# Patient Record
Sex: FEMALE | Race: WHITE
[De-identification: names, ages, dates, MRNs, and addresses within clinical notes are randomized per-mention and may not be internally consistent; named-entity substitution may affect disease eponyms.]

---

## 2017-02-13 ENCOUNTER — HOSPITAL ENCOUNTER (OUTPATIENT)
Dept: HOSPITAL 62 - RAD | Age: 42
End: 2017-02-13
Attending: PHYSICIAN ASSISTANT
Payer: MEDICAID

## 2017-02-13 DIAGNOSIS — M79.672: Primary | ICD-10-CM

## 2017-02-13 PROCEDURE — A9503 TC99M MEDRONATE: HCPCS

## 2017-02-13 PROCEDURE — 78315 BONE IMAGING 3 PHASE: CPT

## 2017-03-20 ENCOUNTER — HOSPITAL ENCOUNTER (EMERGENCY)
Dept: HOSPITAL 62 - ER | Age: 42
Discharge: LEFT BEFORE DISCHARGE | End: 2017-03-20
Payer: MEDICAID

## 2017-03-20 DIAGNOSIS — Z53.9: Primary | ICD-10-CM

## 2017-03-20 DIAGNOSIS — S69.90XA: ICD-10-CM

## 2018-02-12 ENCOUNTER — HOSPITAL ENCOUNTER (OUTPATIENT)
Dept: HOSPITAL 62 - RAD | Age: 43
End: 2018-02-12
Attending: PHYSICIAN ASSISTANT
Payer: MEDICAID

## 2018-02-12 DIAGNOSIS — R59.1: Primary | ICD-10-CM

## 2018-02-12 PROCEDURE — 76536 US EXAM OF HEAD AND NECK: CPT

## 2018-02-12 NOTE — RADIOLOGY REPORT (SQ)
EXAM DESCRIPTION:  U/S THYROID/SFT TISS HD   NECK



COMPLETED DATE/TIME:  2/12/2018 4:56 pm



REASON FOR STUDY:  GENERALIZED ENLARGED LYMPH NODES R59.1  GENERALIZED ENLARGED LYMPH NODES



COMPARISON:  None.



TECHNIQUE:  Dynamic and static gray-scale images acquired of the thyroid gland. Selected additional c
olor/power Doppler images recorded.  All images stored to PACS.



LIMITATIONS:  None.



FINDINGS:  RIGHT LOBE: Normal size.  Homogeneous echotexture.  4 mm hypoechoic nodule.

LEFT LOBE: Normal size.  Homogeneous echotexture.  No cystic or solid masses.

ISTHMUS: Normal size.  Homogeneous echotexture.  No cystic or solid masses.

OTHER: No other significant finding.



IMPRESSION:  4 mm nodule right lobe.



TECHNICAL DOCUMENTATION:  JOB ID:  [id number]

 2011 [employer]- All Rights Reserved

## 2018-07-19 ENCOUNTER — HOSPITAL ENCOUNTER (OUTPATIENT)
Dept: HOSPITAL 62 - OD | Age: 43
End: 2018-07-19
Attending: INTERNAL MEDICINE
Payer: MEDICAID

## 2018-07-19 DIAGNOSIS — D50.9: Primary | ICD-10-CM

## 2018-07-19 LAB
ADD MANUAL DIFF: (no result)
ANISOCYTOSIS BLD QL SMEAR: (no result)
BASOPHILS # BLD AUTO: 0.1 10^3/UL (ref 0–0.2)
BASOPHILS NFR BLD AUTO: 0.6 % (ref 0–2)
EOSINOPHIL # BLD AUTO: 0.4 10^3/UL (ref 0–0.6)
EOSINOPHIL NFR BLD AUTO: 3.8 % (ref 0–6)
ERYTHROCYTE [DISTWIDTH] IN BLOOD BY AUTOMATED COUNT: 18.3 % (ref 11.5–14)
HCT VFR BLD CALC: 26.4 % (ref 36–47)
HGB BLD-MCNC: 8.2 G/DL (ref 12–15.5)
IRON(TIBC): < 10.1 UG/DL (ref 37–170)
LYMPHOCYTES # BLD AUTO: 2.2 10^3/UL (ref 0.5–4.7)
LYMPHOCYTES NFR BLD AUTO: 23.8 % (ref 13–45)
MCH RBC QN AUTO: 19.1 PG (ref 27–33.4)
MCHC RBC AUTO-ENTMCNC: 30.9 G/DL (ref 32–36)
MCV RBC AUTO: 62 FL (ref 80–97)
MONOCYTES # BLD AUTO: 0.5 10^3/UL (ref 0.1–1.4)
MONOCYTES NFR BLD AUTO: 5.2 % (ref 3–13)
NEUTROPHILS # BLD AUTO: 6.2 10^3/UL (ref 1.7–8.2)
NEUTS SEG NFR BLD AUTO: 66.6 % (ref 42–78)
OVALOCYTES BLD QL SMEAR: (no result)
PLATELET # BLD: 537 10^3/UL (ref 150–450)
PLATELET COMMENT: (no result)
POIKILOCYTOSIS BLD QL SMEAR: (no result)
POLYCHROMASIA BLD QL SMEAR: SLIGHT
RBC # BLD AUTO: 4.26 10^6/UL (ref 3.72–5.28)
TARGETS BLD QL SMEAR: SLIGHT
TOTAL CELLS COUNTED % (AUTO): 100 %
WBC # BLD AUTO: 9.4 10^3/UL (ref 4–10.5)

## 2018-07-19 PROCEDURE — 36415 COLL VENOUS BLD VENIPUNCTURE: CPT

## 2018-07-19 PROCEDURE — 83540 ASSAY OF IRON: CPT

## 2018-07-19 PROCEDURE — 83550 IRON BINDING TEST: CPT

## 2018-07-19 PROCEDURE — 82306 VITAMIN D 25 HYDROXY: CPT

## 2018-07-19 PROCEDURE — 82728 ASSAY OF FERRITIN: CPT

## 2018-07-19 PROCEDURE — 85025 COMPLETE CBC W/AUTO DIFF WBC: CPT

## 2018-07-20 LAB — PATH REV BLD -IMP: (no result)

## 2018-07-27 ENCOUNTER — HOSPITAL ENCOUNTER (OUTPATIENT)
Dept: HOSPITAL 62 - WI | Age: 43
End: 2018-07-27
Attending: INTERNAL MEDICINE
Payer: MEDICAID

## 2018-07-27 DIAGNOSIS — Z12.31: Primary | ICD-10-CM

## 2018-07-27 PROCEDURE — 77067 SCR MAMMO BI INCL CAD: CPT

## 2018-07-27 PROCEDURE — 77063 BREAST TOMOSYNTHESIS BI: CPT

## 2018-07-27 NOTE — WOMENS IMAGING REPORT
EXAM DESCRIPTION:  3D SCREENING MAMMO BILAT



COMPLETED DATE/TIME:  7/27/2018 2:00 pm



REASON FOR STUDY:  SCREENING MAMMO Z12.31  ENCNTR SCREEN MAMMOGRAM FOR MALIGNANT NEOPLASM OF KVNG



COMPARISON:  None.



TECHNIQUE:  Standard craniocaudal and mediolateral oblique views of each breast recorded using digita
l acquisition and breast tomosynthesis.



LIMITATIONS:  None.



FINDINGS:  No masses, calcifications or architectural distortion. No areas of suspicion.

Read with the assistance of CAD.

.[facility] - R2 Cenova Version 1.3

.[facility] - R2 Cenova Version 1.3

.[facility] - R2 Cenova Version 2.4

.[facility] - R2 Cenova Version 2.4

.[facility] - R2  Version 9.2



IMPRESSION:  NORMAL MAMMOGRAM.  BIRADS 1.



BREAST DENSITY:  b. There are scattered areas of fibroglandular density.



BIRAD:  1 NEGATIVE



RECOMMENDATION:  ROUTINE SCREENING



COMMENT:  The patient has been notified of the results by letter per SA requirements. Additional no
tification policies are in place for contacting patient with suspicious or incomplete findings.

Quality ID #225: The American College of Radiology recommends an annual screening mammogram for women
 aged 40 years or over. This facility utilizes a reminder system to ensure that all patients receive 
reminder letters, and/or direct phone calls for appointments. This includes reminders for routine scr
eening mammograms, diagnostic mammograms, or other Breast Imaging Interventions when appropriate.  Th
is patient will be placed in the appropriate reminder system.

The American College of Radiology (ACR) has developed recommendations for screening MRI of the breast
s in certain patient populations, to be used in conjunction with mammography.  Breast MRI surveillanc
e may be appropriate for women with more than 20% lifetime risk of developing breast cancer  as deter
mined by genetic testing, significant family history of the disease, or history of mantle radiation f
or Hodgkins Disease.  ACR Practice Guidelines 2008.

[employer]



PQRS 6045F: Fluoroscopic imaging is not utilized for breast tomosynthesis.



TECHNICAL DOCUMENTATION:  FINDING NUMBER: (1)

ASSESSMENT:  (1)

JOB ID:  [id number]

 2011 [employer]- All Rights Reserved



Reading location - IP/workstation name: [facility]

## 2019-02-09 ENCOUNTER — HOSPITAL ENCOUNTER (EMERGENCY)
Dept: HOSPITAL 62 - ER | Age: 44
Discharge: HOME | End: 2019-02-09
Payer: MEDICAID

## 2019-02-09 VITALS — SYSTOLIC BLOOD PRESSURE: 107 MMHG | DIASTOLIC BLOOD PRESSURE: 69 MMHG

## 2019-02-09 DIAGNOSIS — R06.02: ICD-10-CM

## 2019-02-09 DIAGNOSIS — R07.9: Primary | ICD-10-CM

## 2019-02-09 DIAGNOSIS — R20.0: ICD-10-CM

## 2019-02-09 LAB
ADD MANUAL DIFF: NO
ALBUMIN SERPL-MCNC: 4.8 G/DL (ref 3.5–5)
ALP SERPL-CCNC: 98 U/L (ref 38–126)
ALT SERPL-CCNC: < 6 U/L (ref 9–52)
ANION GAP SERPL CALC-SCNC: 12 MMOL/L (ref 5–19)
AST SERPL-CCNC: 18 U/L (ref 14–36)
BASOPHILS # BLD AUTO: 0.1 10^3/UL (ref 0–0.2)
BASOPHILS NFR BLD AUTO: 1.1 % (ref 0–2)
BILIRUB DIRECT SERPL-MCNC: 0.3 MG/DL (ref 0–0.4)
BILIRUB SERPL-MCNC: 0.4 MG/DL (ref 0.2–1.3)
BUN SERPL-MCNC: 8 MG/DL (ref 7–20)
CALCIUM: 9.6 MG/DL (ref 8.4–10.2)
CHLORIDE SERPL-SCNC: 103 MMOL/L (ref 98–107)
CK MB SERPL-MCNC: 0.48 NG/ML (ref ?–4.55)
CK SERPL-CCNC: 55 U/L (ref 30–135)
CO2 SERPL-SCNC: 27 MMOL/L (ref 22–30)
EOSINOPHIL # BLD AUTO: 0.4 10^3/UL (ref 0–0.6)
EOSINOPHIL NFR BLD AUTO: 3.8 % (ref 0–6)
ERYTHROCYTE [DISTWIDTH] IN BLOOD BY AUTOMATED COUNT: 18.2 % (ref 11.5–14)
GLUCOSE SERPL-MCNC: 117 MG/DL (ref 75–110)
HCT VFR BLD CALC: 32.5 % (ref 36–47)
HGB BLD-MCNC: 10.2 G/DL (ref 12–15.5)
LYMPHOCYTES # BLD AUTO: 2.4 10^3/UL (ref 0.5–4.7)
LYMPHOCYTES NFR BLD AUTO: 23.4 % (ref 13–45)
MCH RBC QN AUTO: 21.5 PG (ref 27–33.4)
MCHC RBC AUTO-ENTMCNC: 31.4 G/DL (ref 32–36)
MCV RBC AUTO: 68 FL (ref 80–97)
MONOCYTES # BLD AUTO: 0.4 10^3/UL (ref 0.1–1.4)
MONOCYTES NFR BLD AUTO: 4.4 % (ref 3–13)
NEUTROPHILS # BLD AUTO: 6.8 10^3/UL (ref 1.7–8.2)
NEUTS SEG NFR BLD AUTO: 67.3 % (ref 42–78)
PLATELET # BLD: 551 10^3/UL (ref 150–450)
POTASSIUM SERPL-SCNC: 4.1 MMOL/L (ref 3.6–5)
PROT SERPL-MCNC: 8.3 G/DL (ref 6.3–8.2)
RBC # BLD AUTO: 4.76 10^6/UL (ref 3.72–5.28)
SODIUM SERPL-SCNC: 141.8 MMOL/L (ref 137–145)
TOTAL CELLS COUNTED % (AUTO): 100 %
TROPONIN I SERPL-MCNC: < 0.012 NG/ML
WBC # BLD AUTO: 10.1 10^3/UL (ref 4–10.5)

## 2019-02-09 PROCEDURE — 93005 ELECTROCARDIOGRAM TRACING: CPT

## 2019-02-09 PROCEDURE — 80053 COMPREHEN METABOLIC PANEL: CPT

## 2019-02-09 PROCEDURE — 82550 ASSAY OF CK (CPK): CPT

## 2019-02-09 PROCEDURE — 85025 COMPLETE CBC W/AUTO DIFF WBC: CPT

## 2019-02-09 PROCEDURE — 82553 CREATINE MB FRACTION: CPT

## 2019-02-09 PROCEDURE — 84484 ASSAY OF TROPONIN QUANT: CPT

## 2019-02-09 PROCEDURE — 71045 X-RAY EXAM CHEST 1 VIEW: CPT

## 2019-02-09 PROCEDURE — 36415 COLL VENOUS BLD VENIPUNCTURE: CPT

## 2019-02-09 PROCEDURE — 93010 ELECTROCARDIOGRAM REPORT: CPT

## 2019-02-09 PROCEDURE — 99285 EMERGENCY DEPT VISIT HI MDM: CPT

## 2019-02-09 NOTE — RADIOLOGY REPORT (SQ)
EXAM DESCRIPTION:  CHEST SINGLE VIEW



COMPLETED DATE/TIME:  2/9/2019 4:11 pm



REASON FOR STUDY:  cp



COMPARISON:  11/5/2014



TECHNIQUE:  Single frontal radiographic view of the chest acquired.



NUMBER OF VIEWS:  One view.



LIMITATIONS:  None.



FINDINGS:  LUNGS AND PLEURA: No pneumothorax. No consolidation or pleural effusion.

MEDIASTINUM AND HILAR STRUCTURES: Stable.

HEART AND VASCULAR STRUCTURES: Stable.

BONES: No acute findings.

HARDWARE: None in the chest.

OTHER: No other significant finding.



IMPRESSION:  NO ACUTE FINDINGS.



TECHNICAL DOCUMENTATION:  JOB ID:  [id number]

[id number]

 2011 [employer]- All Rights Reserved



Reading location - IP/workstation name: [employer]

## 2019-02-09 NOTE — EKG REPORT
SEVERITY:- BORDERLINE ECG -

SINUS RHYTHM

BORDERLINE T ABNORMALITIES, DIFFUSE LEADS

:

Confirmed by: Tony Sherwood 09-Feb-2019 20:11:32

## 2019-02-09 NOTE — ER DOCUMENT REPORT
ED General





- General


Chief Complaint: Chest Pain


Stated Complaint: SHORT OF BREATH/CHEST DISCOMFORT


Time Seen by Provider: 19 15:43


Primary Care Provider: 


LETHA CALLAWAY MD [Primary Care Provider] - Follow up as needed


TRAVEL OUTSIDE OF THE [place] IN LAST 30 DAYS: No





- HPI


Notes: 





Patient presents emergency department for evaluation of chest pain.  She has had

numbness and tingling in bilateral arms as well.  The numbness and tingling is 

been present for 3 days.  Chest pain started yesterday afternoon.  It has been 

constant since onset.  It is diffuse.  She describes it as a heaviness.  It does

not radiate.  She denies any aggravating or alleviating factors.  She states 

exertion does not seem to change in any way.  She also describes a separate pain

in her lateral chest bilaterally.  She describes it as achy and states it is 

worsened with deep breaths.  She denies any associated shortness of breath, 

nausea, diaphoresis, near syncope.





- Related Data


Allergies/Adverse Reactions: 


                                        





doxycycline [Doxycycline] Allergy (Severe, Verified 19 14:49)


   Difficulty breathing


metronidazole [From Flagyl] Allergy (Severe, Verified 19 14:49)


   Difficulty breathing


Metronidazole HCl [From Flagyl] Allergy (Severe, Verified 19 14:49)


   Difficulty breathing


Penicillins Allergy (Unknown, Verified 19 14:49)


   Unknown reaction


Pickles Allergy (Uncoded 19 14:49)


   











Past Medical History





- General


Information source: Patient





- Social History


Smoking Status: Never Smoker


Frequency of alcohol use: None


Drug Abuse: None


Family History: Arthritis, CAD, CVA, DM, Hyperlipidemia, Hypertension, 

Malignancy, Thyroid Disfunction


Patient has suicidal ideation: No


Patient has homicidal ideation: No





- Past Medical History


Cardiac Medical History: 


   Denies: Hx Hypertension - after pregnancy; no longer


Pulmonary Medical History: Reports: Hx Bronchitis


Endocrine Medical History: Denies: Hx Diabetes Mellitus Type 1, Hx Diabetes 

Mellitus Type 2


Renal/ Medical History: Reports: Hx Ovarian Cysts.  Denies: Hx Peritoneal 

Dialysis


Musculoskeletal Medical History: Reports Hx Musculoskeletal Trauma - left foot 

fracture


Traumatic Medical History: Reports: Hx Fractures


Past Surgical History: Reports: Hx  Section - x3, Hx Kidney (Renal 

Surgery) - at age 14 months





- Immunizations


Hx Diphtheria, Pertussis, Tetanus Vaccination: Yes





Review of Systems





- Review of Systems


Constitutional: No symptoms reported


EENT: No symptoms reported


Cardiovascular: Chest pain


Respiratory: No symptoms reported


Gastrointestinal: No symptoms reported


Genitourinary: No symptoms reported


Skin: No symptoms reported


Hematologic/Lymphatic: No symptoms reported


Neurological/Psychological: No symptoms reported





Physical Exam





- Vital signs


Vitals: 





                                        











Temp Pulse Resp BP Pulse Ox


 


 98.8 F   81   16   150/78 H  98 


 


 19 15:07  19 15:07  19 15:07  19 15:07  19 15:07














- General


General appearance: Appears well


In distress: None





- HEENT


Head: Normocephalic


Pupils: PERRL





- Respiratory


Respiratory status: No respiratory distress


Chest status: Tender





- Cardiovascular


Rhythm: Regular


Heart sounds: Normal auscultation





- Abdominal


Inspection: Normal


Distension: No distension


Bowel sounds: Normal


Tenderness: Nontender





- Extremities


General upper extremity: Normal inspection


General lower extremity: Normal inspection.  No: Tender





- Neurological


Cognition: Normal


Orientation: AAOx4





- Skin


Skin Temperature: Warm


Skin Moisture: Dry





Course





- Re-evaluation


Re-evalutation: 





19 18:19


Patient presents to the emergency department for evaluation of chest pain.  She 

has multiple pains in her chest, they have been present for over 24 hours.  She 

does have mild risk factors with this pain does not seem cardiac in nature.  We 

discussed this at length.  I cleaned her the no test I performed here could 

totally rule out coronary artery disease.  I strongly encouraged her to follow-

up with her family doctor and address all modifiable risk factors.  She voiced 

understanding to this.  She states she does not "think it is my heart."  I 

explained to her that she would need further testing to further evaluate for 

this.  She understands this.  She will follow-up with Dr. AVILA.  She is feeling 

improved here and will be discharged.





- Vital Signs


Vital signs: 





                                        











Temp Pulse Resp BP Pulse Ox


 


 98.8 F   81   20   95/65 L  99 


 


 19 15:07  19 15:07  19 18:01  19 18:01  19 18:01














- Laboratory


Result Diagrams: 


                                 19 15:47





                                 19 15:47


Laboratory results interpreted by me: 





                                        











  02/09/19 02/09/19





  15:47 15:47


 


Hgb  10.2 L 


 


Hct  32.5 L 


 


MCV  68 L 


 


MCH  21.5 L 


 


MCHC  31.4 L 


 


RDW  18.2 H 


 


Plt Count  551 H 


 


Glucose   117 H


 


ALT   < 6 L


 


Total Protein   8.3 H














- Diagnostic Test


Radiology reviewed: Image reviewed, Reports reviewed





- EKG Interpretation by Me


EKG shows normal: Sinus rhythm


Rate: Normal


Rhythm: NSR


Additional EKG results interpreted by me: 





19 18:18


Normal sinus rhythm rate of 79 bpm.  Normal axis, intervals, no acute ST-T wave 

changes concerning for ischemia or infarction.





Discharge





- Discharge


Clinical Impression: 


 Chest pain





Disposition: HOME, SELF-CARE


Instructions:  Chest Pain of Unclear Cause ([facility])


Additional Instructions: 


No clear etiology was found for your chest pain today.  Follow-up with your 

primary care physician this week.  If your pain worsens, you develop new or 

concerning symptoms of any sort, return immediately to the emergency department 

for evaluation.


Referrals: 


LETHA CALLAWAY MD [Primary Care Provider] - Follow up as needed

## 2019-02-26 ENCOUNTER — HOSPITAL ENCOUNTER (EMERGENCY)
Dept: HOSPITAL 62 - ER | Age: 44
Discharge: HOME | End: 2019-02-26
Payer: MEDICAID

## 2019-02-26 VITALS — SYSTOLIC BLOOD PRESSURE: 116 MMHG | DIASTOLIC BLOOD PRESSURE: 87 MMHG

## 2019-02-26 DIAGNOSIS — M25.572: Primary | ICD-10-CM

## 2019-02-26 PROCEDURE — 73610 X-RAY EXAM OF ANKLE: CPT

## 2019-02-26 PROCEDURE — L1902 AFO ANKLE GAUNTLET PRE OTS: HCPCS

## 2019-02-26 PROCEDURE — 99283 EMERGENCY DEPT VISIT LOW MDM: CPT

## 2019-02-26 NOTE — RADIOLOGY REPORT (SQ)
EXAM DESCRIPTION:  ANKLE LEFT COMPLETE



COMPLETED DATE/TIME:  2/26/2019 1:56 pm



REASON FOR STUDY:  pain s/p injury, rolled her ankle



COMPARISON:  None.



NUMBER OF VIEWS:  Three views.



TECHNIQUE:  AP, lateral, and oblique radiographic images acquired of the left ankle.



LIMITATIONS:  None.



FINDINGS:  MINERALIZATION: Normal.

BONES: No acute fracture or dislocation.  No worrisome bone lesions.

JOINTS: No effusions.

SOFT TISSUES: No soft tissue swelling. No foreign body.

OTHER: No other significant finding.



IMPRESSION:  NEGATIVE STUDY OF THE LEFT ANKLE. NO RADIOGRAPHIC EVIDENCE OF ACUTE INJURY.



TECHNICAL DOCUMENTATION:  JOB ID:  [id number]

 2011 [employer]- All Rights Reserved



Reading location - IP/workstation name: [email or web address]

## 2019-02-26 NOTE — ER DOCUMENT REPORT
HPI





- HPI


Time Seen by Provider: 19 13:30


Pain Level: 5


Notes: 





Patient is a 43-year-old female with no significant past medical history who 

presents to the emergency department complaining of left lateral ankle pain 

status post injury prior to arrival.  Patient states that she stepped into a 

hole and her ankle inverted causing pain.  Patient states that she heard a 

"pop."  Pain does not radiate.  She is still able to ambulate, but is limping.  

She has noticed some mild swelling to the lateral ankle.  No other concerns or 

complaints.  Denies any headache, fever, head injury, neck pain, URI, sore 

throat, chest pain, palpitations, syncope, cough, shortness of breath, wheeze, 

dyspnea, abdominal pain, nausea/vomiting/diarrhea, urinary retention, dysuria, 

hematuria, back pain, loss of control of bowel or bladder, numbness/tingling, 

saddle anesthesia, muscle paralysis, or rash.





- ROS


Systems Reviewed and Negative: Yes All other systems reviewed and negative





- REPRODUCTIVE


Reproductive: DENIES: Pregnant:





Past Medical History





- Social History


Smoking Status: Unknown if Ever Smoked


Family History: Arthritis, CAD, CVA, DM, Hyperlipidemia, Hypertension, 

Malignancy, Thyroid Disfunction





- Past Medical History


Cardiac Medical History: 


   Denies: Hx Hypertension - after pregnancy; no longer


Pulmonary Medical History: Reports: Hx Bronchitis


Endocrine Medical History: Denies: Hx Diabetes Mellitus Type 1, Hx Diabetes 

Mellitus Type 2


Renal/ Medical History: Reports: Hx Ovarian Cysts.  Denies: Hx Peritoneal 

Dialysis


Musculoskeletal Medical History: Reports Hx Musculoskeletal Trauma - left foot 

fracture


Traumatic Medical History: Reports: Hx Fractures


Past Surgical History: Reports: Hx  Section - x3, Hx Kidney (Renal 

Surgery) - at age 14 months





- Immunizations


Hx Diphtheria, Pertussis, Tetanus Vaccination: Yes





Vertical Provider Document





- CONSTITUTIONAL


Agree With Documented VS: Yes


Notes: 





PHYSICAL EXAMINATION:





GENERAL: Well-appearing, well-nourished and in no acute distress.





LUNGS: Breath sounds clear to auscultation bilaterally and equal.  No wheezes 

rales or rhonchi.





HEART: Regular rate and rhythm without murmurs, rubs, gallops.





Musculoskeletal: Lt ankle:  + mild swelling lateral malleolus with minimal 

ecchymosis.  LROM to passive/active dorsiflexion. Strength 5+/5. N/V intact 

distal.  + tenderness to the lateral malleolus and area of the ATFL.  No bony 

tenderness of the foot otherwise.  No tenderness at fibular head.  Achilles 

intact.  





Extremities:  No cyanosis, clubbing, or edema b/l.  Peripheral pulses 2+.  

Capillary refill less than 3 seconds.





NEUROLOGICAL: Normal speech, limping gait.  Normal sensory, motor exams 





PSYCH: Normal mood, normal affect.





SKIN: Warm, Dry, normal turgor, no rashes or lesions noted.





- INFECTION CONTROL


TRAVEL OUTSIDE OF THE [place] IN LAST 30 DAYS: No





Course





- Re-evaluation


Re-evalutation: 





19 14:38


Patient is an afebrile, well-hydrated, 43-year-old female who presents to the ED

with left ankle pain which I suspect to be a sprain versus strain.  Vitals are 

acceptable without any significant tachycardia, tachypnea, or hypoxia.  PE is 

otherwise unremarkable for any neurovascular compromise, obvious tendon/ligament

rupture, obvious fracture/dislocation, septic joint.  X-ray was unremarkable for

any acute pathology.  Ankle stirrup provided today.  Pt declined crutches.  

Patient declined any Tylenol.  Pt given ice.  Patient is nontoxic-appearing.  

Patient is able to ambulate and weight-bear although she is limping.  No other 

labs or imaging warranted at this time based on H&P.  Conservative measures 

otherwise for symptoms.  Recheck with your PCM in 3-5 days.  Consider consult 

orthopedics.  Return to the ED with any worsening/concerning symptoms otherwise 

as reviewed in discharge.  Patient is in agreement.





- Vital Signs


Vital signs: 


                                        











Temp Pulse Resp BP Pulse Ox


 


 97.9 F   79   14   116/87 H  98 


 


 19 13:17  19 13:17  19 13:17  19 13:17  19 13:17














Discharge





- Discharge


Clinical Impression: 


Left ankle pain


Qualifiers:


 Chronicity: acute Qualified Code(s): M25.572 - Pain in left ankle and joints of

left foot





Condition: Stable


Disposition: HOME, SELF-CARE


Instructions:  Sprained Ankle ([facility]), Ice & Elevation ([facility])


Additional Instructions: 


Rest, Ice, Compression, Elevation


Use splint as directed


Tylenol/ibuprofen as needed


Light stretches daily


Strength exercises as able


Moist heat and massage may help


F/u with your PCP in 3-5 days for a recheck


Consider consult(s) with Orthopedics/physical therapy for ongoing/worsening 

symptoms





Return to the ED with any worsening symptoms and/or development of fever, 

headache, chest pain, palpitations, syncope, shortness of breath, trouble 

breathing, abdominal pain, n/v/d, muscle weakness/paralysis, numbness/tingling, 

swelling, redness, or other worsening symptoms that are concerning to you.


Forms:  Elevated Blood Pressure


Referrals: 


LETHA CALLAWAY MD [Primary Care Provider] - Follow up as needed


[facility] [Provider Group] - Follow up as needed

## 2019-08-14 LAB
APPEARANCE UR: (no result)
APTT PPP: YELLOW S
BILIRUB UR QL STRIP: NEGATIVE
ERYTHROCYTE [DISTWIDTH] IN BLOOD BY AUTOMATED COUNT: 13.2 % (ref 11.5–14)
GLUCOSE UR STRIP-MCNC: NEGATIVE MG/DL
HCT VFR BLD CALC: 41.5 % (ref 36–47)
HGB BLD-MCNC: 14 G/DL (ref 12–15.5)
KETONES UR STRIP-MCNC: NEGATIVE MG/DL
MCH RBC QN AUTO: 28.5 PG (ref 27–33.4)
MCHC RBC AUTO-ENTMCNC: 33.7 G/DL (ref 32–36)
MCV RBC AUTO: 85 FL (ref 80–97)
NITRITE UR QL STRIP: NEGATIVE
PH UR STRIP: 6 [PH] (ref 5–9)
PLATELET # BLD: 335 10^3/UL (ref 150–450)
PROT UR STRIP-MCNC: NEGATIVE MG/DL
RBC # BLD AUTO: 4.91 10^6/UL (ref 3.72–5.28)
SP GR UR STRIP: 1.02
UROBILINOGEN UR-MCNC: NEGATIVE MG/DL (ref ?–2)
WBC # BLD AUTO: 8.9 10^3/UL (ref 4–10.5)

## 2019-08-15 ENCOUNTER — HOSPITAL ENCOUNTER (OUTPATIENT)
Dept: HOSPITAL 62 - OROUT | Age: 44
Discharge: HOME | End: 2019-08-15
Attending: OBSTETRICS & GYNECOLOGY
Payer: MEDICAID

## 2019-08-15 VITALS — DIASTOLIC BLOOD PRESSURE: 82 MMHG | SYSTOLIC BLOOD PRESSURE: 143 MMHG

## 2019-08-15 DIAGNOSIS — R10.2: ICD-10-CM

## 2019-08-15 DIAGNOSIS — Z79.899: ICD-10-CM

## 2019-08-15 DIAGNOSIS — N94.6: ICD-10-CM

## 2019-08-15 DIAGNOSIS — N92.1: Primary | ICD-10-CM

## 2019-08-15 DIAGNOSIS — N93.9: ICD-10-CM

## 2019-08-15 PROCEDURE — 88305 TISSUE EXAM BY PATHOLOGIST: CPT

## 2019-08-15 PROCEDURE — 00952 ANES VAG PX HYSTSC&/HSG: CPT

## 2019-08-15 PROCEDURE — 81001 URINALYSIS AUTO W/SCOPE: CPT

## 2019-08-15 PROCEDURE — 58563 HYSTEROSCOPY ABLATION: CPT

## 2019-08-15 PROCEDURE — 36415 COLL VENOUS BLD VENIPUNCTURE: CPT

## 2019-08-15 PROCEDURE — 81025 URINE PREGNANCY TEST: CPT

## 2019-08-15 PROCEDURE — 85027 COMPLETE CBC AUTOMATED: CPT

## 2019-08-15 PROCEDURE — 94640 AIRWAY INHALATION TREATMENT: CPT

## 2019-08-15 NOTE — OPERATIVE REPORT
Operative Report


DATE OF SURGERY: 08/15/19


PREOPERATIVE DIAGNOSIS: abnormal uterine bleeding


POSTOPERATIVE DIAGNOSIS: same


OPERATION: hysteroscopy with attempted novasure ablation


SURGEON: TAMEKA CORBETT


ANESTHESIA: GA


COMPLICATIONS: 





unable to deploy novasure 


ESTIMATED BLOOD LOSS: 50 cc


INTRAOPERATIVE FINDINGS: normal endometrial cavity, stenotic cervical canal


PROCEDURE: 





patient was prepared and drapped in a normal sterile fashion in a dorsal 

lithotomy position in [facility].  An in and out catheter was performed of 

approximately 850 cc of clear urine.  Sterile speculum was placed into the 

vagina and the cervix was grasped on the anterior aspect with single-tooth 

tenaculum and prepped with Betadine.  The uterus was then sounded to 

approximately 8 cm to 6 cm cervical canal.  The cervix was difficult to dilate 

but it was dilated to 5 mm.  A hysteroscope was then introduced and a benign-

appearing cavity was was noted.  I then deployed the NovaSure device.  It was 

introduced several times into the endometrial cavity however the device would 

never passed the array.  Device never confirmed the cavity integrity despite 

several attempts and adjustments.  Approximately 10 trials to fire the device 

the procedure was abandoned.  The difficulties was explained to the patient's 

 and the patient at the end of the case.  Patient Tolerated our attempts 

well.  Taken to PACU in stable condition.

## 2019-09-12 ENCOUNTER — HOSPITAL ENCOUNTER (EMERGENCY)
Dept: HOSPITAL 62 - ER | Age: 44
LOS: 1 days | Discharge: HOME | End: 2019-09-13
Payer: MEDICAID

## 2019-09-12 VITALS — SYSTOLIC BLOOD PRESSURE: 133 MMHG | DIASTOLIC BLOOD PRESSURE: 76 MMHG

## 2019-09-12 DIAGNOSIS — S99.911A: Primary | ICD-10-CM

## 2019-09-12 DIAGNOSIS — S99.922A: ICD-10-CM
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
## 2019-09-12 PROCEDURE — 73630 X-RAY EXAM OF FOOT: CPT

## 2019-09-12 PROCEDURE — 96372 THER/PROPH/DIAG INJ SC/IM: CPT

## 2019-09-12 PROCEDURE — L4350 ANKLE CONTROL ORTHO PRE OTS: HCPCS

## 2019-09-12 PROCEDURE — 99283 EMERGENCY DEPT VISIT LOW MDM: CPT

## 2019-09-12 NOTE — RADIOLOGY REPORT (SQ)
EXAM DESCRIPTION:

Left foot, three views series



CLINICAL HISTORY:

44 years Female, bone tenderness



COMPARISON:

None.



FINDINGS:

No evidence for fracture dislocation. No suspicious bone, joint

space or soft tissue abnormalities.



IMPRESSION:

Negative left foot series.

## 2019-09-12 NOTE — ER DOCUMENT REPORT
ED General





- General


Chief Complaint: Foot Pain


Stated Complaint: LEFT FOOT INJURY


Time Seen by Provider: 19 23:24


Primary Care Provider: 


LETHA CALLAWAY MD [Primary Care Provider] - Follow up as needed


Notes: 





Patient is a 44-year-old female is that presents to the emergency department for

chief complaint of right ankle pain after injury.  Patient states she was 

wearing sandals, she stepped off her porch, and inverted her right ankle, and 

fell down, she hyperextended her left toe as well.  This occurred a about 2 

hours ago, and she is currently describing her pain as a 9 out of 10 in her 

right ankle, and its very difficult to put any weight on it.  She states the 

pain is severe, and throbbing and aching in nature.  The toe pain she has on the

left side, is more of an ache, and more tolerable.  She thinks maybe she broke 

her ankle.  She did not hear a crack or a pop however.  No prior injuries to 

that ankle.  She denies head injury, numbness, tingling or weakness.





Past Medical History: Denies chronic medical conditions


Past Surgical History: C-sections


Social History: Denies tobacco, alcohol or drug use.


Family History: Reviewed and noncontributory for presenting illness


Allergies: Reviewed, see documented allergy list. 





REVIEW OF SYSTEMS:


Other than noted above, the 12 point review of systems was reviewed with the 

patient and were negative, all pertinent findings are included in the HPI.





PHYSICAL EXAMINATION:





Vital signs reviewed, nursing noted reviewed. 





GENERAL: Well-appearing, well-nourished and in no acute distress.  Appears 

uncomfortable.





HEAD: Atraumatic, normocephalic.





EYES: Eyes appear normal, sclera anicteric, conjunctiva are normal.





ENT:  Moist mucous membranes.





NECK: Normal range of motion, supple without lymphadenopathy





LUNGS: Breath sounds clear to auscultation bilaterally and equal.  No wheezes 

rales or rhonchi.





HEART: Regular rate and rhythm without murmurs





EXTREMITIES: Patient's right ankle, is mildly edematous, tender to palpate along

the anterior talofibular ligament, and pain with inversion and range of motion 

of the ankle.  There is no tenderness over the medial malleolus.  The toes have 

good cap refill, good range of motion.  The rest the patient's extremity exam on

the right is unremarkable.  The left great toe, is tenderness to palpation at 

the first metatarsophalangeal joint, without swelling, erythema or edema.  Good 

range of motion, cap refill less than 3 seconds, and the rest the patient's 

extremity exam is otherwise grossly unremarkable.





NEUROLOGICAL: No focal neurological deficits. Moves all extremities 

spontaneously Motor and sensory grossly intact on exam.





PSYCH: Normal mood, normal affect.





SKIN: Warm, Dry, normal turgor, no rashes or lesions noted on exposed skin





TRAVEL OUTSIDE OF THE [place] IN LAST 30 DAYS: No





- Related Data


Allergies/Adverse Reactions: 


                                        





doxycycline [Doxycycline] Allergy (Severe, Verified 19 09:45)


   Difficulty breathing


metronidazole [From Flagyl] Allergy (Severe, Verified 19 09:45)


   Difficulty breathing


Metronidazole HCl [From Flagyl] Allergy (Severe, Verified 19 09:45)


   Difficulty breathing


Penicillins Allergy (Unknown, Verified 19 09:45)


   Unknown reaction


Pickles Allergy (Uncoded 19 09:45)


   











Past Medical History





- Social History


Smoking Status: Unknown if Ever Smoked


Family History: Arthritis, CAD, CVA, DM, Hyperlipidemia, Hypertension, 

Malignancy, Thyroid Disfunction


Patient has suicidal ideation: No


Patient has homicidal ideation: No





- Past Medical History


Cardiac Medical History: 


   Denies: Hx Coronary Artery Disease, Hx Heart Attack, Hx Hypertension - after 

pregnancy


Pulmonary Medical History: Reports: Hx Bronchitis


   Denies: Hx Asthma, Hx COPD, Hx Pneumonia


Neurological Medical History: Denies: Hx Cerebrovascular Accident, Hx Seizures


Endocrine Medical History: Denies: Hx Diabetes Mellitus Type 1, Hx Diabetes 

Mellitus Type 2


Renal/ Medical History: Reports: Hx Ovarian Cysts.  Denies: Hx Peritoneal 

Dialysis


Musculoskeletal Medical History: Denies Hx Arthritis, Reports Hx Musculoskeletal

Trauma - left foot fracture


Traumatic Medical History: Reports: Hx Fractures


Past Surgical History: Reports: Hx  Section - x3, Hx Kidney (Renal 

Surgery) - at age 14 months





- Immunizations


Hx Diphtheria, Pertussis, Tetanus Vaccination: Yes





Physical Exam





- Vital signs


Vitals: 


                                        











Temp Pulse Resp BP Pulse Ox


 


 98.6 F   95   18   133/76 H  94 


 


 19 19:29  19 19:29  19 19:29  19 19:29  19 19:29














Course





- Re-evaluation


Re-evalutation: 





Patient seen and examined vital signs reviewed. 





Patient was evaluated and treated as appropriate for the patient's presenting 

symptoms and complaint, with consideration of any critical or life threatening 

conditions that may be associated with their obtained history and exam as noted 

above.





Patient was treated with IM Toradol for pain, x-rays reviewed and negative for 

acute fracture or injury.  There is noted a possible ossific lesion, and the 

calcaneal cuboid joint, however the patient is not having point tenderness at 

this location, she is having tenderness over the dorsal aspect of the foot.  

Therefore we will place the patient in a ankle splint and crutches, and 

discharge her to follow-up with orthopedics.





The patient was re-evaluated and was stable





Evaluation was most consistent with right ankle injury, and left great toe 

injury.





Plan of care was discussed with the patient at this point, after careful 

consideration I feel that that patient can be discharged from the emergency 

department, the patient was educated treatments and reasons to return to the 

emergency department based on their presumed diagnosis as noted above, they were

advised to followup with a primary care physician in 2-3 days. Patient was 

agreeable to plan of care.





*Note is created using voice recognition software and may contain spelling, 

syntax or grammatical errors.








                                        





Foot X-Ray  19 19:56


IMPRESSION:


 


Curvilinear ossific density projecting adjacent to the lateral


aspect of the calcaneocuboid joint which could indicate an


age-indeterminate avulsion fracture fragment. Correlate with


point tenderness/recent trauma.


 


 


copyright  [employer]- All Rights Reserved


 

















- Vital Signs


Vital signs: 


                                        











Temp Pulse Resp BP Pulse Ox


 


 98.6 F   95   18   133/76 H  94 


 


 19 19:29  19 19:29  19 19:29  19 19:29  19 19:29














Procedures





- Immobilization


  ** Right Ankle


Pre-Proc Neuro Vasc Exam: Normal


Immobilizer type: Ankle stirrup


Performed by: PCT


Post-Proc Neuro Vasc Exam: Normal


Alignment checked and good: Yes





Discharge





- Discharge


Clinical Impression: 


Right ankle injury


Qualifiers:


 Encounter type: initial encounter Qualified Code(s): S99.911A - Unspecified 

injury of right ankle, initial encounter





Injury of left toe


Qualifiers:


 Encounter type: initial encounter Qualified Code(s): S99.922A - Unspecified 

injury of left foot, initial encounter





Condition: Stable


Disposition: HOME, SELF-CARE


Instructions:  Sprained Ankle ([facility])


Additional Instructions: 


Please take over-the-counter Motrin, up to 600 mg, every 8 hours for pain, keep 

your foot elevated, and apply a warm or cool compress for 20 minutes on 20 

minutes off 3-4 times daily.  If needed please follow-up with an orthopedic 

surgeon.


Forms:  Return to Work


Referrals: 


LETHA CALLAWAY MD [Primary Care Provider] - Follow up in 3-5 days

## 2019-09-12 NOTE — RADIOLOGY REPORT (SQ)
EXAM DESCRIPTION: 



XR FOOT 3 OR MORE VIEWS



COMPLETED DATE/TME:  09/12/2019 19:56



CLINICAL HISTORY: 



44 years, Female, bone tenderness



COMPARISON:

None.



NUMBER OF VIEWS:

Three



TECHNIQUE:

Frontal, oblique, and lateral radiograph of the right foot were

obtained.



LIMITATIONS:

None.



FINDINGS:



Moderate posterior calcaneal spur. Mild degenerative changes are

suspected about the anterior aspect of the tibiotalar joint on

the lateral radiograph. A curvilinear ossific density projects

adjacent to the lateral aspect of the calcaneocuboid joint which

could indicate an age-indeterminate avulsion fracture fragment.

No additional osseous anomalies are appreciated.



IMPRESSION:



Curvilinear ossific density projecting adjacent to the lateral

aspect of the calcaneocuboid joint which could indicate an

age-indeterminate avulsion fracture fragment. Correlate with

point tenderness/recent trauma.

 



copyright 2011 [employer]- All Rights Reserved

## 2019-10-21 LAB
ALBUMIN SERPL-MCNC: 4.2 G/DL (ref 3.5–5)
ALP SERPL-CCNC: 80 U/L (ref 38–126)
ANION GAP SERPL CALC-SCNC: 10 MMOL/L (ref 5–19)
APPEARANCE UR: (no result)
APTT PPP: YELLOW S
AST SERPL-CCNC: 20 U/L (ref 14–36)
BILIRUB DIRECT SERPL-MCNC: 0.1 MG/DL (ref 0–0.4)
BILIRUB SERPL-MCNC: 0.2 MG/DL (ref 0.2–1.3)
BILIRUB UR QL STRIP: NEGATIVE
BUN SERPL-MCNC: 7 MG/DL (ref 7–20)
CALCIUM: 9.5 MG/DL (ref 8.4–10.2)
CHLORIDE SERPL-SCNC: 101 MMOL/L (ref 98–107)
CO2 SERPL-SCNC: 29 MMOL/L (ref 22–30)
ERYTHROCYTE [DISTWIDTH] IN BLOOD BY AUTOMATED COUNT: 13.5 % (ref 11.5–14)
GLUCOSE SERPL-MCNC: 122 MG/DL (ref 75–110)
GLUCOSE UR STRIP-MCNC: NEGATIVE MG/DL
HCT VFR BLD CALC: 39.7 % (ref 36–47)
HGB BLD-MCNC: 13.2 G/DL (ref 12–15.5)
KETONES UR STRIP-MCNC: NEGATIVE MG/DL
MCH RBC QN AUTO: 28 PG (ref 27–33.4)
MCHC RBC AUTO-ENTMCNC: 33.2 G/DL (ref 32–36)
MCV RBC AUTO: 84 FL (ref 80–97)
NITRITE UR QL STRIP: NEGATIVE
PH UR STRIP: 6 [PH] (ref 5–9)
PLATELET # BLD: 370 10^3/UL (ref 150–450)
POTASSIUM SERPL-SCNC: 4.1 MMOL/L (ref 3.6–5)
PROT SERPL-MCNC: 7.9 G/DL (ref 6.3–8.2)
PROT UR STRIP-MCNC: NEGATIVE MG/DL
RBC # BLD AUTO: 4.71 10^6/UL (ref 3.72–5.28)
SP GR UR STRIP: 1.02
UROBILINOGEN UR-MCNC: NEGATIVE MG/DL (ref ?–2)
WBC # BLD AUTO: 8.5 10^3/UL (ref 4–10.5)

## 2019-10-24 ENCOUNTER — HOSPITAL ENCOUNTER (OUTPATIENT)
Dept: HOSPITAL 62 - OROUT | Age: 44
LOS: 1 days | Discharge: HOME | End: 2019-10-25
Attending: OBSTETRICS & GYNECOLOGY
Payer: MEDICAID

## 2019-10-24 DIAGNOSIS — N72: ICD-10-CM

## 2019-10-24 DIAGNOSIS — R10.2: ICD-10-CM

## 2019-10-24 DIAGNOSIS — N93.8: Primary | ICD-10-CM

## 2019-10-24 DIAGNOSIS — N83.8: ICD-10-CM

## 2019-10-24 DIAGNOSIS — N92.1: ICD-10-CM

## 2019-10-24 DIAGNOSIS — N87.1: ICD-10-CM

## 2019-10-24 DIAGNOSIS — N80.0: ICD-10-CM

## 2019-10-24 DIAGNOSIS — N93.9: ICD-10-CM

## 2019-10-24 DIAGNOSIS — K66.0: ICD-10-CM

## 2019-10-24 PROCEDURE — 58571 TLH W/T/O 250 G OR LESS: CPT

## 2019-10-24 PROCEDURE — 86900 BLOOD TYPING SEROLOGIC ABO: CPT

## 2019-10-24 PROCEDURE — 86901 BLOOD TYPING SEROLOGIC RH(D): CPT

## 2019-10-24 PROCEDURE — 36415 COLL VENOUS BLD VENIPUNCTURE: CPT

## 2019-10-24 PROCEDURE — 94799 UNLISTED PULMONARY SVC/PX: CPT

## 2019-10-24 PROCEDURE — 81001 URINALYSIS AUTO W/SCOPE: CPT

## 2019-10-24 PROCEDURE — A4649 SURGICAL SUPPLIES: HCPCS

## 2019-10-24 PROCEDURE — 86850 RBC ANTIBODY SCREEN: CPT

## 2019-10-24 PROCEDURE — 84703 CHORIONIC GONADOTROPIN ASSAY: CPT

## 2019-10-24 PROCEDURE — 81025 URINE PREGNANCY TEST: CPT

## 2019-10-24 PROCEDURE — 88307 TISSUE EXAM BY PATHOLOGIST: CPT

## 2019-10-24 PROCEDURE — 85027 COMPLETE CBC AUTOMATED: CPT

## 2019-10-24 PROCEDURE — 80053 COMPREHEN METABOLIC PANEL: CPT

## 2019-10-24 RX ADMIN — DOCUSATE SODIUM SCH MG: 100 CAPSULE, LIQUID FILLED ORAL at 17:45

## 2019-10-24 RX ADMIN — OXYCODONE AND ACETAMINOPHEN PRN TAB: 5; 325 TABLET ORAL at 14:50

## 2019-10-24 RX ADMIN — KETOROLAC TROMETHAMINE SCH MG: 30 INJECTION, SOLUTION INTRAMUSCULAR at 21:20

## 2019-10-24 RX ADMIN — OXYCODONE AND ACETAMINOPHEN PRN TAB: 5; 325 TABLET ORAL at 19:38

## 2019-10-24 NOTE — OPERATIVE REPORT
Operative Report


DATE OF SURGERY: 10/24/19


PREOPERATIVE DIAGNOSIS: abnormal uterine bleeding, chronic pelvic pain, pelvic 

adhesion disease


POSTOPERATIVE DIAGNOSIS: Same


OPERATION: Robotic assisted total laparoscopic hysterectomy with bilateral 

salpingectomy and cystotomy repair


SURGEON: TAMEKA CORBETT


1ST ASSISTANT: TEMO VILLASENOR


ANESTHESIA: GA


TISSUE REMOVED OR ALTERED: Uterus cervix and bilateral fallopian tube segments


COMPLICATIONS: 





Incidental cystotomy repaired intraoperatively


ESTIMATED BLOOD LOSS: 200


INTRAOPERATIVE FINDINGS: Uterus measuring approximately 12 weeks size boggy in 

appearance consistent with adenomyosis multiple bladder adhesions. several 

omental adhesions to the pelvic sidewalls and anterior abdominal wall. with 

normal normal-appearing ovaries


PROCEDURE: 





Patient was taken to the operating room prepared and draped in normal sterile 

fashion in dorsolithotomy position. Under sterile conditions a Mcgarry catheter 

was placed to gravity. Speculum was placed into the vagina and the cervix was 

grasped on the anterior lip with a single-tooth tenaculum. The cervix was then 

dilated to accommodate a medium V care uterine manipulator. Manipulate it was 

placed gloves were changed and attention was turned to the upper portion of the 

case. A 2-1/2 cm umbilical skin incision was made 11 blade and this was carried 

through to the underlying layer of fascia with the same 11 blade. It was grasped

to Ronaldo's and transected with Yan's.  Peritoneal cavity was entered sharply 

with Metzenbaums. A GelPort was placed in a normal fashion the camera port and 

air seal in the appropriate locations.  Peritoneum was then inflated with 

approximately 2 L of CO2 gas. The camera was then introduced into the peritoneal

cavity through the camera port and the patient was placed in steep 

Trendelenburg. The above findings were noted. Under direct visualization two 5 

mm ports were placed approximately 10 cm on either side of the umbilicus. The 

robot was then docked with the vessel sealer placed on the patient's left and 

the monopolar scissors placed placed on the patient's right.   The omental 

adhesions were teased down from the anterior abdominal wall and pelvic sidewalls

using a blunt blade using blunt dissection with an atraumatic grasper.  I then 

unscrubbed and set at the robotic console. beginning with the left adnexa 

fallopian tube was transected from the uterus using the vessel sealer and 

monopolar scissors as needed. The fallopian tube was then removed through the 

assistance port. The ovarian ligament was then transected using the vessel 

sealer. The uterine artery was skeletonized using blunt dissection and ligated 

using the vessel sealer down to the level of the external cervical os.  We 

released adhesions as we went . the bladder flap was then begun using monopolar 

scissors and blunt dissection over the V care cup noted through the mucosa.  

Bladder adhesions were excessively thickened great time was used to dissect the 

bladder away from the lower uterine segment.  attention was then turned to the 

right adnexa where the fallopian tube was transected in a similar fashion. The 

utero-ovarian ligament was transected using the vessel sealer. The Uterine 

artery was then transected using the vessel sealer and skeletonized using blunt 

dissection. The vessel sealer was again used to completely transect the uterine 

artery down to the level of the external cervical os. The bladder flap was 

completed using similar sharp and blunt dissection. Once the bladder was felt to

be adequately away from the lower uterine segment, the colpotomy was begun on 

the anterior aspect of the cervix following the outline of the V care cup muco

sa. The cup was followed in a circumferential fashion completely around the 

cervix until it was completely freed. The specimen was then removed through the 

vaginal defect. The instruments were then changed to a Jason needle  and 

pro-grasp. AV lock needle was introduced through the assistance port. The lock 

needle was used to close the vaginal cuff with good hemostasis. The needle was 

then removed through the assistance port.  The cystotomy had been noted just 

prior to beginning closure of the vaginal cuff .the vaginal cuff was closed 

attention was turned to the cystotomy and a 3-0 chromic was introduced .the 

defect angle was tied with the chromic .however the chromic suture proved to be 

inadequate and easily broke therefore we switched to a 3-0 Vicryl .0 Vicryl was 

introduced in the again tying at the defect angle the defect was closed in a 

running manner oversewed with several sutures of the same .we then filled the 

bladder with approximately 60 cc of sterile water .was no leakage noted 

.therefore the procedure was concluded and the needle was removed after being 

ligated with laparoscopic scissors .the peritoneal cavity was carefully 

inspected the ureters were noted to both be peristalsing and there was no signs 

of hydroureter. The robot was then undocked. The fascia was closed at the 

umbilical skin incision seen 0 Vicryl 3 skin incisions were closed using 4-0 

Vicryl. Sponge lap and needle counts were correct x2 and the patient was taken 

to recovery in stable condition.  Patient's Mcgarry catheter was kept in place and

will remain in place x2 weeks and removed at her postop visit outpatient

## 2019-10-25 VITALS — DIASTOLIC BLOOD PRESSURE: 82 MMHG | SYSTOLIC BLOOD PRESSURE: 119 MMHG

## 2019-10-25 LAB
ERYTHROCYTE [DISTWIDTH] IN BLOOD BY AUTOMATED COUNT: 13.3 % (ref 11.5–14)
HCT VFR BLD CALC: 32 % (ref 36–47)
HGB BLD-MCNC: 10.5 G/DL (ref 12–15.5)
MCH RBC QN AUTO: 27.5 PG (ref 27–33.4)
MCHC RBC AUTO-ENTMCNC: 32.7 G/DL (ref 32–36)
MCV RBC AUTO: 84 FL (ref 80–97)
PLATELET # BLD: 348 10^3/UL (ref 150–450)
RBC # BLD AUTO: 3.81 10^6/UL (ref 3.72–5.28)
WBC # BLD AUTO: 12.3 10^3/UL (ref 4–10.5)

## 2019-10-25 RX ADMIN — DOCUSATE SODIUM SCH MG: 100 CAPSULE, LIQUID FILLED ORAL at 09:59

## 2019-10-25 RX ADMIN — IBUPROFEN SCH MG: 800 TABLET, FILM COATED ORAL at 12:44

## 2019-10-25 RX ADMIN — IBUPROFEN SCH MG: 800 TABLET, FILM COATED ORAL at 05:35

## 2019-10-25 RX ADMIN — KETOROLAC TROMETHAMINE SCH MG: 30 INJECTION, SOLUTION INTRAMUSCULAR at 05:36

## 2019-10-25 RX ADMIN — OXYCODONE AND ACETAMINOPHEN PRN TAB: 5; 325 TABLET ORAL at 04:35

## 2019-10-27 ENCOUNTER — HOSPITAL ENCOUNTER (EMERGENCY)
Dept: HOSPITAL 62 - ER | Age: 44
Discharge: HOME | End: 2019-10-27
Payer: MEDICAID

## 2019-10-27 VITALS — DIASTOLIC BLOOD PRESSURE: 99 MMHG | SYSTOLIC BLOOD PRESSURE: 143 MMHG

## 2019-10-27 DIAGNOSIS — Z90.710: ICD-10-CM

## 2019-10-27 DIAGNOSIS — Z88.0: ICD-10-CM

## 2019-10-27 DIAGNOSIS — T83.9XXA: Primary | ICD-10-CM
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
## 2019-10-27 PROCEDURE — 99283 EMERGENCY DEPT VISIT LOW MDM: CPT

## 2019-10-27 NOTE — ER DOCUMENT REPORT
ED GI/





- General


Chief Complaint: Problem with Urinary Catheter


Stated Complaint: SHORTNESS OF BREATH


Time Seen by Provider: 10/27/19 04:55


Primary Care Provider: 


TAMEKA CORBETT MD [ACTIVE STAFF] - Follow up tomorrow


Notes: 





Patient is a 44-year-old female that comes to the emergency department for chief

complaint of Mcgarry catheter problems.  She states that it feels like it is 

coming out, she states that there is also pressure, discomfort, and she keeps 

getting the bag tangled on her leg.  Patient states that she had a transvaginal 

hysterectomy by Dr. Corbett in this hospital on 10/24/2019 which is just under 

3 days ago.  Patient states that she was told that there was a  small cut over 

her bladder and sutures had to be placed, she states the Mcgarry was placed for 

this reason.  She states for the first 8 to 12 hours she had bleeding but she 

has been running clear urine since that time.  She denies abdominal pain, nausea

or vomiting, fever/chills, flank pain.  She states she feels great except for t

he Mcgarry in place.


TRAVEL OUTSIDE OF THE [place] IN LAST 30 DAYS: No





- Related Data


Allergies/Adverse Reactions: 


                                        





doxycycline [Doxycycline] Allergy (Severe, Verified 19 09:45)


   Difficulty breathing


metronidazole [From Flagyl] Allergy (Severe, Verified 19 09:45)


   Difficulty breathing


Metronidazole HCl [From Flagyl] Allergy (Severe, Verified 19 09:45)


   Difficulty breathing


Penicillins Allergy (Unknown, Verified 19 09:45)


   Unknown reaction


wheat Allergy (Verified 10/24/19 05:54)


   


Pickles Allergy (Uncoded 19 09:45)


   








Home Medications: percocet.  motrin





Past Medical History





- General


Information source: Patient





- Social History


Smoking Status: Never Smoker


Chew tobacco use (# tins/day): No


Frequency of alcohol use: None


Lives with: Family


Family History: Arthritis, CAD, CVA, DM, Hyperlipidemia, Hypertension, 

Malignancy, Thyroid Disfunction


Patient has suicidal ideation: No


Patient has homicidal ideation: No





- Past Medical History


Cardiac Medical History: 


   Denies: Hx Coronary Artery Disease, Hx Heart Attack


   Comment Only: Hx Hypertension - ONLY TWO WEEKS after pregnancy


Pulmonary Medical History: Reports: Hx Bronchitis - TWICE A YEAR


   Denies: Hx Asthma, Hx COPD, Hx Pneumonia


Neurological Medical History: Denies: Hx Cerebrovascular Accident, Hx Seizures


Endocrine Medical History: Denies: Hx Diabetes Mellitus Type 1, Hx Diabetes 

Mellitus Type 2


Renal/ Medical History: Reports: Hx Ovarian Cysts.  Denies: Hx Peritoneal 

Dialysis


Musculoskeletal Medical History: Denies Hx Arthritis, Reports Hx Musculoskeletal

Trauma - left foot fracture


Traumatic Medical History: Reports: Hx Fractures


Past Surgical History: Reports: Hx  Section - x3, Hx Kidney (Renal 

Surgery) - at age 14 months





- Immunizations


Hx Diphtheria, Pertussis, Tetanus Vaccination: Yes





Review of Systems





- Review of Systems


Constitutional: No symptoms reported


EENT: No symptoms reported


Cardiovascular: No symptoms reported


Respiratory: No symptoms reported


Gastrointestinal: No symptoms reported


Genitourinary: See HPI


Female Genitourinary: No symptoms reported


Musculoskeletal: No symptoms reported


Skin: No symptoms reported


Hematologic/Lymphatic: No symptoms reported


Neurological/Psychological: No symptoms reported





Physical Exam





- Vital signs


Vitals: 


                                        











Resp Pulse Ox


 


 27 H  96 


 


 10/27/19 03:34  10/27/19 03:34














- Notes


Notes: 





GENERAL: Alert, interacts well. No acute distress.


HEAD: Normocephalic, atraumatic.


EYES: Pupils equal, round, and reactive to light. Extraocular movements intact.


ENT: Oral mucosa moist, tongue midline. Oropharynx unremarkable. 


NECK: Full range of motion. Supple. Trachea midline.


LUNGS: Clear to auscultation bilaterally, no wheezes, rales, or rhonchi. No 

respiratory distress.


HEART: Regular rate and rhythm. No murmur


ABDOMEN: Abdomen soft and nontender.  There is a midline and to lower small 

incision areas which are horizontal.  These are closed with no bleeding or 

discharge.  No surrounding erythema or tenderness.  Benign abdomen.


GENITOURINARY: Mcgarry catheter in place without complication, clear urine running

from the Mcgarry


EXTREMITIES: Moves all 4 extremities spontaneously. No edema, normal radial and 

dorsalis pedis pulses bilaterally. No cyanosis.


BACK: no cervical, thoracic, lumbar midline tenderness. No saddle anesthesia, 

normal distal neurovascular exam. Moves all extremities in full range of motion.


NEUROLOGICAL: Alert and oriented x3. Normal speech. Cranial nerves II through 

XII grossly intact. 


PSYCH: Normal affect, normal mood.


SKIN: Warm, dry, normal turgor. No rashes or lesions noted.





Course





- Re-evaluation


Re-evalutation: 


Patient is very well-appearing, vital signs unremarkable, Mcgarry catheter appears

to be in place without difficulty, urine is running clear.  Patient admits that 

the Mcgarry is very irritating and frustrating and she wants this removed.  She 

denies any other complaints.  She declines any lab testing.





I did note that Dr. Corbett wrote the patient is to have a Mcgarry in place for 

the next 2 weeks at her postop note.  I discussed with Dr. Connor, he recommends

discussing with Dr. Jordan OB/GYN on-call.  I discussed with Dr. Jordan, 

discussed details, she recommends that patient not have the Mcgarry removed at 

this time because of the risks, she states the patient can follow-up in the offi

ce tomorrow with Dr. Corbett for additional discussion.





I discussed with patient.  I discussed the risks of removal including 

perforation of the bladder and/or drainage of urine into the pelvic area with 

significant illness afterwards.  Patient requests a different bag be provided to

her and she be educated on this, this was provided.  Patient states she will 

follow-up in the office tomorrow for additional evaluation.  I did discuss it 

was possible she might not have it removed tomorrow as well.  Patient states 

understanding.





- Vital Signs


Vital signs: 


                                        











Temp Pulse Resp BP Pulse Ox


 


 98.3 F      17   143/99 H  97 


 


 10/27/19 04:09     10/27/19 06:01  10/27/19 06:00  10/27/19 06:01














Discharge





- Discharge


Clinical Impression: 


Mcgarry catheter problem


Qualifiers:


 Encounter type: initial encounter Qualified Code(s): T83.9XXA - Unspecified 

complication of genitourinary prosthetic device, implant and graft, initial 

encounter





Condition: Stable


Disposition: HOME, SELF-CARE


Additional Instructions: 


I spoke with Dr. Jordan this morning.  Unfortunately we are unable to remove 

your Mcgarry catheter at this time because of the risks of doing so early. In

structions are to follow up with Dr. Corbett tomorrow in the office for 

evaluation, although I cannot guarantee the removal at that time.





Return for any concerning symptoms including vomiting, severe pain, or any other

concerning or worsening symptoms.


Referrals: 


TAMEKA CORBETT MD [ACTIVE STAFF] - Follow up tomorrow

## 2019-10-30 ENCOUNTER — HOSPITAL ENCOUNTER (EMERGENCY)
Dept: HOSPITAL 62 - ER | Age: 44
Discharge: HOME | End: 2019-10-30
Payer: MEDICAID

## 2019-10-30 VITALS — DIASTOLIC BLOOD PRESSURE: 80 MMHG | SYSTOLIC BLOOD PRESSURE: 138 MMHG

## 2019-10-30 DIAGNOSIS — Z90.710: ICD-10-CM

## 2019-10-30 DIAGNOSIS — T83.84XA: Primary | ICD-10-CM
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
## 2019-10-30 PROCEDURE — 99283 EMERGENCY DEPT VISIT LOW MDM: CPT

## 2019-10-30 NOTE — ER DOCUMENT REPORT
HPI





- HPI


Patient complains to provider of: Urinary catheter pain


Time Seen by Provider: 10/30/19 20:28


Pain Level: 4


Context: 





Patient is a 44-year-old female presents to the emergency department for "issues

with my catheter."  Patient underwent a transvaginal hysterectomy with Dr. Gee.  A urinary catheter was placed as there was potentially injury to 

patient's bladder.  Patient presented to this facility on 10/27/2019 for 

irritation to said catheter.  Provider spoke with OB/GYN who would like the 

patient to follow-up with Dr. Gee.  Patient voices she followed up with Dr. Gee on 10/28/2019.  Dr. Gee placed on antibiotics and gave her 

lidocaine jelly.  States she has an appointment with Dr. Gee tomorrow for 

catheter removal.  States this evening the catheter was "annoying me so much."  

Patient does voice an increase in pain in vaginal area. Presents to the 

emergency department for catheter removal.


Patient is denying any abdominal pain, vomiting, fevers, catheter leaking.





- REPRODUCTIVE


Reproductive: DENIES: Pregnant:





Past Medical History





- General


Information source: Patient





- Social History


Smoking Status: Never Smoker


Frequency of alcohol use: Rare


Drug Abuse: None


Family History: Arthritis, CAD, CVA, DM, Hyperlipidemia, Hypertension, 

Malignancy, Thyroid Disfunction


Patient has suicidal ideation: No


Patient has homicidal ideation: No





- Past Medical History


Cardiac Medical History: 


   Denies: Hx Coronary Artery Disease, Hx Heart Attack


   Comment Only: Hx Hypertension - ONLY TWO WEEKS after pregnancy


Pulmonary Medical History: Reports: Hx Bronchitis - TWICE A YEAR


   Denies: Hx Asthma, Hx COPD, Hx Pneumonia


Neurological Medical History: Denies: Hx Cerebrovascular Accident, Hx Seizures


Endocrine Medical History: Denies: Hx Diabetes Mellitus Type 1, Hx Diabetes 

Mellitus Type 2


Renal/ Medical History: Reports: Hx Ovarian Cysts.  Denies: Hx Peritoneal 

Dialysis


Musculoskeletal Medical History: Denies Hx Arthritis, Reports Hx Musculoskeletal

Trauma - left foot fracture


Traumatic Medical History: Reports: Hx Fractures


Past Surgical History: Reports: Hx  Section - x3, Hx Kidney (Renal 

Surgery) - at age 14 months





- Immunizations


Hx Diphtheria, Pertussis, Tetanus Vaccination: Yes





Vertical Provider Document





- CONSTITUTIONAL


Agree With Documented VS: Yes


Notes: 





GENERAL: Alert, interacts well. No acute distress.


HEAD: Normocephalic, atraumatic.


EYES: Pupils equal, round, and reactive to light. Extraocular movements intact.


ENT: Oral mucosa moist, tongue midline. 


NECK: Full range of motion. Supple. Trachea midline.


LUNGS: Clear to auscultation bilaterally, no wheezes, rales, or rhonchi. No 

respiratory distress.


HEART: Regular rate and rhythm. No murmur


ABDOMEN: Soft, non-tender. Non-distended. Bowel sounds present in all 4 

quadrants.


EXTREMITIES: Moves all 4 extremities spontaneously. No edema, normal radial and 

dorsalis pedis pulses bilaterally. No cyanosis.


BACK: no cervical, thoracic, lumbar midline tenderness. No saddle anesthesia, 

normal distal neurovascular exam. 


NEUROLOGICAL: Alert and oriented x3. Normal speech. cranial nerves II through 

XII grossly intact 


PSYCH: Normal affect, normal mood.


SKIN: Warm, dry, normal turgor. No rashes or lesions noted.








- INFECTION CONTROL


TRAVEL OUTSIDE OF THE [place] IN LAST 30 DAYS: No





Course





- Re-evaluation


Re-evalutation: 





10/30/19 21:15


Exam preformed with Phoebe PATEL LPN at bedside. 


No vaginal discharge noted, no erythema or swelling of labia minora or majora.


Catheter does appear intact it is not leaking.





I discussed this case with OB/GYN Dr. Jordan. She is suggesting giving the patient

benzocaine spray.  States she is uncomfortable taking the catheter out as she is

unsure why Dr. Gee would have left it in for an extended period of time.  

She is unable to review Dr. Gee's notes at this time.  Discussed if at all 

possible the patient should keep catheter in place until following up with Dr. Gee tomorrow.





I discussed this with patient at length at bedside.  I discussed unfortunately 

were unable to take the catheter out.  I discussed use of over-the-counter pain 

medication as well as prescription pain medication.  Discussed close follow-up 

with Dr. Gee, patient stable for discharge.








- Vital Signs


Vital signs: 


                                        











Temp Pulse Resp BP Pulse Ox


 


 98.3 F   76   16   140/87 H  98 


 


 10/30/19 20:35  10/30/19 20:35  10/30/19 20:35  10/30/19 20:35  10/30/19 20:35














Discharge





- Discharge


Clinical Impression: 


Urinary catheter complication


Qualifiers:


 Encounter type: sequela Qualified Code(s): T83.9XXS - Unspecified complication 

of genitourinary prosthetic device, implant and graft, sequela





Condition: Stable


Disposition: HOME, SELF-CARE


Instructions:  [facility] ([facility])


Additional Instructions: 


As we discussed you have been seen and treated in the emergency department for 

complications with your urinary catheter.  I have spoken with OB/GYN Dr. Jordan.  

She would like me to give you the benzocaine spray to use for comfort.  She 

would also like you to make sure you follow-up with Dr. Gee in the morning 

for catheter removal.  Unfortunately Dr. Jordan does not feel comfortable removing

the catheter at this time she is unsure of why Dr. Gee has placed it in for

an extended period of time.  Please return to the emergency room for any 

concerns otherwise follow-up with Dr. Gee tomorrow.


Referrals: 


LETHA CALLAWAY MD [Primary Care Provider] - Follow up as needed

## 2020-10-26 ENCOUNTER — HOSPITAL ENCOUNTER (EMERGENCY)
Dept: HOSPITAL 62 - ER | Age: 45
Discharge: HOME | End: 2020-10-26
Payer: MEDICAID

## 2020-10-26 VITALS — DIASTOLIC BLOOD PRESSURE: 95 MMHG | SYSTOLIC BLOOD PRESSURE: 134 MMHG

## 2020-10-26 DIAGNOSIS — J40: ICD-10-CM

## 2020-10-26 DIAGNOSIS — Z88.8: ICD-10-CM

## 2020-10-26 DIAGNOSIS — R05: ICD-10-CM

## 2020-10-26 DIAGNOSIS — Z88.0: ICD-10-CM

## 2020-10-26 DIAGNOSIS — U07.1: Primary | ICD-10-CM

## 2020-10-26 DIAGNOSIS — R50.9: ICD-10-CM

## 2020-10-26 LAB
A TYPE INFLUENZA AG: NEGATIVE
B INFLUENZA AG: NEGATIVE

## 2020-10-26 PROCEDURE — C9803 HOPD COVID-19 SPEC COLLECT: HCPCS

## 2020-10-26 PROCEDURE — 87880 STREP A ASSAY W/OPTIC: CPT

## 2020-10-26 PROCEDURE — 71045 X-RAY EXAM CHEST 1 VIEW: CPT

## 2020-10-26 PROCEDURE — 87804 INFLUENZA ASSAY W/OPTIC: CPT

## 2020-10-26 PROCEDURE — 87635 SARS-COV-2 COVID-19 AMP PRB: CPT

## 2020-10-26 PROCEDURE — 87070 CULTURE OTHR SPECIMN AEROBIC: CPT

## 2020-10-26 PROCEDURE — 99284 EMERGENCY DEPT VISIT MOD MDM: CPT

## 2020-10-26 NOTE — ER DOCUMENT REPORT
ED General





- General


Chief Complaint: Flu Symptoms


Stated Complaint: COUGH FEVER


Time Seen by Provider: 10/26/20 19:46


Primary Care Provider: 


LETHA CALLAWAY MD [ACTIVE STAFF] - Follow up as needed


Notes: 





Patient is a 45-year-old white female with no significant past medical history 

presents the emergency department today with a chief complaint of cough.  States

has been going for about a week.  States it is typical of her yearly bronchitis.

 She reports that she has had low-grade fevers.  States that her son became sick

a few days after her.  Thinks that she gave it to him.  States the cough is 

productive of a discolored sputum with occasional bloody streaks.  Denies any 

chest pain or shortness of breath.  No vomiting or diarrhea.  No rashes or 

recent travel.  Denies any known contacts with anyone suspicion for COVID-19.


TRAVEL OUTSIDE OF THE [place] IN LAST 30 DAYS: No





- Related Data


Allergies/Adverse Reactions: 


                                        





doxycycline [Doxycycline] Allergy (Severe, Verified 10/26/20 19:45)


   Difficulty breathing


metronidazole [From Flagyl] Allergy (Severe, Verified 10/26/20 19:45)


   Difficulty breathing


Metronidazole HCl [From Flagyl] Allergy (Severe, Verified 10/26/20 19:45)


   Difficulty breathing


Penicillins Allergy (Unknown, Verified 10/26/20 19:45)


   Unknown reaction


wheat Allergy (Verified 10/26/20 19:45)


   


Pickles Allergy (Uncoded 19 09:45)


   











Past Medical History





- Social History


Smoking Status: Never Smoker


Frequency of alcohol use: None


Drug Abuse: None


Family History: Arthritis, CAD, CVA, DM, Hyperlipidemia, Hypertension, 

Malignancy, Thyroid Disfunction





- Past Medical History


Cardiac Medical History: 


   Denies: Hx Coronary Artery Disease, Hx Heart Attack


   Comment Only: Hx Hypertension - ONLY TWO WEEKS after pregnancy


Pulmonary Medical History: Reports: Hx Bronchitis - TWICE A YEAR


   Denies: Hx Asthma, Hx COPD, Hx Pneumonia


Neurological Medical History: Denies: Hx Cerebrovascular Accident, Hx Seizures


Endocrine Medical History: Denies: Hx Diabetes Mellitus Type 1, Hx Diabetes 

Mellitus Type 2


Renal/ Medical History: Reports: Hx Ovarian Cysts.  Denies: Hx Peritoneal 

Dialysis


Musculoskeletal Medical History: Denies Hx Arthritis, Reports Hx Musculoskeletal

Trauma - left foot fracture


Traumatic Medical History: Reports: Hx Fractures


Past Surgical History: Reports: Hx  Section - x3, Hx Kidney (Renal 

Surgery) - at age 14 months





- Immunizations


Hx Diphtheria, Pertussis, Tetanus Vaccination: Yes





Review of Systems





- Review of Systems


Constitutional: Fever


EENT: denies: Nose pain


Cardiovascular: denies: Dyspnea


Respiratory: denies: Short of breath


Gastrointestinal: denies: Poor appetite


Genitourinary: denies: Frequency


Female Genitourinary: denies: Heavy/abnormal periods


Musculoskeletal: denies: Neck pain


Skin: denies: Lesions


Hematologic/Lymphatic: denies: Blood clots


Neurological/Psychological: denies: Weakness





Physical Exam





- Vital signs


Vitals: 





                                        











Temp Pulse Resp BP Pulse Ox


 


 98.4 F   90   16   134/95 H  95 


 


 10/26/20 18:36  10/26/20 18:36  10/26/20 18:36  10/26/20 18:36  10/26/20 18:36














- General


General appearance: Appears well, Alert


In distress: None





- HEENT


Head: Normocephalic, Atraumatic


Eyes: Normal


Conjunctiva: Normal


Extraocular movements intact: Yes


Eyelashes: Normal


Pupils: PERRL


Ears: Normal


External canal: Normal


Tympanic membrane: Normal


Nasal: Normal


Mouth/Lips: Normal


Pharynx: Normal


Neck: Normal, Supple





- Respiratory


Respiratory status: No respiratory distress


Chest status: Nontender


Breath sounds: Normal


Chest palpation: Normal





- Cardiovascular


Rhythm: Regular


Heart sounds: Normal auscultation





- Neurological


Neuro grossly intact: Yes


Cognition: Normal


Orientation: AAOx4





- Psychological


Associated symptoms: Normal affect, Normal mood





- Skin


Skin Temperature: Warm


Skin Moisture: Dry


Skin Color: Normal





Course





- Re-evaluation


Re-evalutation: 





10/26/20 21:44


X-rays negative for any acute process per radiologist.  Flu swab and rapid strep

negative.  Patient is pending a COVID-19 swab.  She is a patient under 

investigation and will quarantine herself in her home until she is called with a

negative COVID-19 result or until given further guidance if positive.  Counseled

her regarding the importance of outpatient follow-up and advised she return here

any ER immediately with any new, persistent or worsening symptoms.  She 

verbalized understood and agreed.  Will prescribe a short course of prednisone 

and give her an albuterol inhaler.





- Vital Signs


Vital signs: 





                                        











Temp Pulse Resp BP Pulse Ox


 


 98.4 F   90   16   134/95 H  95 


 


 10/26/20 18:36  10/26/20 18:36  10/26/20 18:36  10/26/20 18:36  10/26/20 18:36














Discharge





- Discharge


Clinical Impression: 


 Bronchitis





Condition: Stable


Disposition: HOME, SELF-CARE


Instructions:  COVID-19 Guidance for Persons Under Investigation, Bronchitis 

([facility])


Additional Instructions: 


You are considered a patient under investigation for COVID-19.  Please self 

isolate and quarantine in your home until you are called with a negative test 

result.  If it is positive you will be given further instructions by phone.  

Please follow-up with your regular doctor by phone.  Please return here or any 

ER immediately with any new, persistent or worsening symptoms.


Prescriptions: 


Prednisone [Deltasone 20 mg Tablet] 40 mg PO DAILY #6 tablet


Albuterol Sulfate [Proair Respiclick] 90 mcg IH Q4 PRN #1 [email or web address]


 PRN Reason: 


Referrals: 


LETHA CALLAWAY MD [ACTIVE STAFF] - Follow up as needed

## 2020-10-26 NOTE — RADIOLOGY REPORT (SQ)
EXAM DESCRIPTION: 



XR CHEST 1 VIEW



COMPLETED DATE/TME:  10/26/2020 19:46



CLINICAL HISTORY: 



45 years, Female, cough



COMPARISON:

20/9/2019





TECHNIQUE:

PA view of the chest





FINDINGS:



Cardiomediastinal silhouette is not enlarged. No suspicious lung

pleural bone abnormalities.



IMPRESSION:



Negative chest x-ray.

## 2020-10-26 NOTE — ER DOCUMENT REPORT
ED Medical Screen (RME)





- General


Stated Complaint: COUGH FEVER


Time Seen by Provider: 10/26/20 19:46


Primary Care Provider: 


LETHA CALLAWAY MD [Primary Care Provider] - Follow up as needed


Notes: 





Patient presents with cough congestion for the past 2 weeks.  Patient states she

noted some blood in her sputum today.  Patient reports sore throat that she 

attributes to the postnasal drip.  Patient denies any nausea vomiting or 

diarrhea.  Patient does report fever of 100.8 today





I have greeted and performed a rapid initial assessment of this patient.  A 

comprehensive ED assessment and evaluation of the patient, analysis of test 

results and completion of the medical decision making process will be conducted 

by additional ED providers.


TRAVEL OUTSIDE OF THE [place] IN LAST 30 DAYS: No





- Related Data


Allergies/Adverse Reactions: 


                                        





doxycycline [Doxycycline] Allergy (Severe, Verified 10/26/20 19:45)


   Difficulty breathing


metronidazole [From Flagyl] Allergy (Severe, Verified 10/26/20 19:45)


   Difficulty breathing


Metronidazole HCl [From Flagyl] Allergy (Severe, Verified 10/26/20 19:45)


   Difficulty breathing


Penicillins Allergy (Unknown, Verified 10/26/20 19:45)


   Unknown reaction


wheat Allergy (Verified 10/26/20 19:45)


   


Pickles Allergy (Uncoded 19 09:45)


   











Past Medical History





- Social History


Frequency of alcohol use: None


Drug Abuse: None





- Past Medical History


Cardiac Medical History: 


   Denies: Hx Coronary Artery Disease, Hx Heart Attack


   Comment Only: Hx Hypertension - ONLY TWO WEEKS after pregnancy


Pulmonary Medical History: Reports: Hx Bronchitis - TWICE A YEAR


   Denies: Hx Asthma, Hx COPD, Hx Pneumonia


Neurological Medical History: Denies: Hx Cerebrovascular Accident, Hx Seizures


Endocrine Medical History: Denies: Hx Diabetes Mellitus Type 1, Hx Diabetes 

Mellitus Type 2


Renal/ Medical History: Reports: Hx Ovarian Cysts.  Denies: Hx Peritoneal 

Dialysis


Musculoskeltal Medical History: Denies Hx Arthritis, Reports Hx Musculoskeletal 

Trauma - left foot fracture


Traumatic Medical History: Reports: Hx Fractures


Past Surgical History: Reports: Hx  Section - x3, Hx Kidney (Renal 

Surgery) - at age 14 months





- Immunizations


Hx Diphtheria, Pertussis, Tetanus Vaccination: Yes





Physical Exam





- Vital signs


Vitals: 





                                        











Temp Pulse Resp BP Pulse Ox


 


 98.4 F   90   16   134/95 H  95 


 


 10/26/20 18:36  10/26/20 18:36  10/26/20 18:36  10/26/20 18:36  10/26/20 18:36














- Respiratory


Respiratory status: No respiratory distress


Breath sounds: Nonproductive cough





Course





- Vital Signs


Vital signs: 





                                        











Temp Pulse Resp BP Pulse Ox


 


 98.4 F   90   16   134/95 H  95 


 


 10/26/20 18:36  10/26/20 18:36  10/26/20 18:36  10/26/20 18:36  10/26/20 18:36














Doctor's Discharge





- Discharge


Referrals: 


LETHA CALLAWAY MD [Primary Care Provider] - Follow up as needed

## 2020-11-15 ENCOUNTER — HOSPITAL ENCOUNTER (EMERGENCY)
Dept: HOSPITAL 62 - ER | Age: 45
LOS: 1 days | Discharge: HOME | End: 2020-11-16
Payer: MEDICAID

## 2020-11-15 VITALS — SYSTOLIC BLOOD PRESSURE: 143 MMHG | DIASTOLIC BLOOD PRESSURE: 97 MMHG

## 2020-11-15 DIAGNOSIS — Z88.1: ICD-10-CM

## 2020-11-15 DIAGNOSIS — R05: ICD-10-CM

## 2020-11-15 DIAGNOSIS — R10.84: Primary | ICD-10-CM

## 2020-11-15 DIAGNOSIS — Z88.0: ICD-10-CM

## 2020-11-15 DIAGNOSIS — R06.02: ICD-10-CM

## 2020-11-15 DIAGNOSIS — R11.2: ICD-10-CM

## 2020-11-15 DIAGNOSIS — Z91.018: ICD-10-CM

## 2020-11-15 DIAGNOSIS — R06.2: ICD-10-CM

## 2020-11-15 LAB
ADD MANUAL DIFF: NO
ALBUMIN SERPL-MCNC: 4.4 G/DL (ref 3.5–5)
ALP SERPL-CCNC: 90 U/L (ref 38–126)
ANION GAP SERPL CALC-SCNC: 9 MMOL/L (ref 5–19)
APPEARANCE UR: (no result)
APTT PPP: YELLOW S
AST SERPL-CCNC: 24 U/L (ref 14–36)
BASOPHILS # BLD AUTO: 0.1 10^3/UL (ref 0–0.2)
BASOPHILS NFR BLD AUTO: 0.8 % (ref 0–2)
BILIRUB DIRECT SERPL-MCNC: 0.1 MG/DL (ref 0–0.4)
BILIRUB SERPL-MCNC: 0.3 MG/DL (ref 0.2–1.3)
BILIRUB UR QL STRIP: NEGATIVE
BUN SERPL-MCNC: 13 MG/DL (ref 7–20)
CALCIUM: 9.3 MG/DL (ref 8.4–10.2)
CHLORIDE SERPL-SCNC: 103 MMOL/L (ref 98–107)
CO2 SERPL-SCNC: 28 MMOL/L (ref 22–30)
EOSINOPHIL # BLD AUTO: 0.8 10^3/UL (ref 0–0.6)
EOSINOPHIL NFR BLD AUTO: 6.2 % (ref 0–6)
ERYTHROCYTE [DISTWIDTH] IN BLOOD BY AUTOMATED COUNT: 15.2 % (ref 11.5–14)
GLUCOSE SERPL-MCNC: 104 MG/DL (ref 75–110)
GLUCOSE UR STRIP-MCNC: NEGATIVE MG/DL
HCT VFR BLD CALC: 39.1 % (ref 36–47)
HGB BLD-MCNC: 12.9 G/DL (ref 12–15.5)
KETONES UR STRIP-MCNC: NEGATIVE MG/DL
LYMPHOCYTES # BLD AUTO: 2.2 10^3/UL (ref 0.5–4.7)
LYMPHOCYTES NFR BLD AUTO: 18.1 % (ref 13–45)
MCH RBC QN AUTO: 26.7 PG (ref 27–33.4)
MCHC RBC AUTO-ENTMCNC: 33.1 G/DL (ref 32–36)
MCV RBC AUTO: 81 FL (ref 80–97)
MONOCYTES # BLD AUTO: 0.8 10^3/UL (ref 0.1–1.4)
MONOCYTES NFR BLD AUTO: 6.8 % (ref 3–13)
NEUTROPHILS # BLD AUTO: 8.4 10^3/UL (ref 1.7–8.2)
NEUTS SEG NFR BLD AUTO: 68.1 % (ref 42–78)
NITRITE UR QL STRIP: NEGATIVE
PH UR STRIP: 7 [PH] (ref 5–9)
PLATELET # BLD: 389 10^3/UL (ref 150–450)
POTASSIUM SERPL-SCNC: 4.4 MMOL/L (ref 3.6–5)
PROT SERPL-MCNC: 7.7 G/DL (ref 6.3–8.2)
PROT UR STRIP-MCNC: NEGATIVE MG/DL
RBC # BLD AUTO: 4.85 10^6/UL (ref 3.72–5.28)
SP GR UR STRIP: 1.01
TOTAL CELLS COUNTED % (AUTO): 100 %
UROBILINOGEN UR-MCNC: NEGATIVE MG/DL (ref ?–2)
WBC # BLD AUTO: 12.3 10^3/UL (ref 4–10.5)

## 2020-11-15 PROCEDURE — 87086 URINE CULTURE/COLONY COUNT: CPT

## 2020-11-15 PROCEDURE — 36415 COLL VENOUS BLD VENIPUNCTURE: CPT

## 2020-11-15 PROCEDURE — 80053 COMPREHEN METABOLIC PANEL: CPT

## 2020-11-15 PROCEDURE — 96372 THER/PROPH/DIAG INJ SC/IM: CPT

## 2020-11-15 PROCEDURE — 94640 AIRWAY INHALATION TREATMENT: CPT

## 2020-11-15 PROCEDURE — 83690 ASSAY OF LIPASE: CPT

## 2020-11-15 PROCEDURE — 81001 URINALYSIS AUTO W/SCOPE: CPT

## 2020-11-15 PROCEDURE — 71045 X-RAY EXAM CHEST 1 VIEW: CPT

## 2020-11-15 PROCEDURE — 85025 COMPLETE CBC W/AUTO DIFF WBC: CPT

## 2020-11-15 PROCEDURE — 76705 ECHO EXAM OF ABDOMEN: CPT

## 2020-11-15 PROCEDURE — 99285 EMERGENCY DEPT VISIT HI MDM: CPT

## 2020-11-15 NOTE — RADIOLOGY REPORT (SQ)
EXAM DESCRIPTION: 



US ABDOMEN LIMITED



COMPLETED DATE/TME:  11/15/2020 21:47



CLINICAL HISTORY: 



45 years, Female, Upper quadrant abdominal pain times a week 



Findings:



Liver is within normal limits measuring 14.7 cm. Gallbladder is

unremarkable with no evidence for stones, wall thickening or

pericholecystic fluid. Possible positive sonographic Grant sign.

No free fluid in the right upper quadrant. No biliary dilatation

with CBD measuring 4 mm. Right kidney does not demonstrate

hydronephrosis.



IMPRESSION:



No significant evidence for cholelithiasis or acute

cholecystitis.

## 2020-11-15 NOTE — ER DOCUMENT REPORT
ED Medical Screen (RME)





- General


Chief Complaint: Abdominal Pain


Stated Complaint: ABDOMINAL PAIN


Time Seen by Provider: 11/15/20 21:06


Primary Care Provider: 


LETHA CALLAWAY MD [Primary Care Provider] - Follow up as needed


Mode of Arrival: Ambulatory


Information source: Patient


Notes: 





45-year-old female presents to ED for complaint of right upper quadrant abdomi

nal pain that started about a week ago.  She states that she ignored it because 

she just ignores pain sometimes.  She states it got worse and spread to the back

and the shoulder blades.  She states now the pain is spread to the whole right 

side.  She states she has a history of 3 C-sections and a hysterectomy a year 

ago.  She does have a history of bronchitis that she gets twice a year but this 

is not the bronchitis.  She states it hurts to take a deep breath it makes her 

abdomen hurt.  She states that also sometimes when the pain gets so bad it feels

like she cannot take a deep breath.  She states this morning she coughed up some

bile drainage and she has been nauseated for the last week.  Patient is alert 

oriented respirations regular nonlabored speaking in full sentences.  She does 

have tenderness to the right upper and lower quadrant of her abdomen at this 

time.  I have ordered blood urine and ultrasound I have called ultrasound and 

they come and get her and take her to ultrasound now.

















I have greeted and performed a rapid initial assessment of this patient.  A 

comprehensive ED assessment and evaluation of the patient, analysis of test 

results and completion of medical decision making process will be conducted by 

an additional ED providers.


TRAVEL OUTSIDE OF THE [place] IN LAST 30 DAYS: No





- Related Data


Allergies/Adverse Reactions: 


                                        





doxycycline [Doxycycline] Allergy (Severe, Verified 10/26/20 19:45)


   Difficulty breathing


metronidazole [From Flagyl] Allergy (Severe, Verified 10/26/20 19:45)


   Difficulty breathing


Metronidazole HCl [From Flagyl] Allergy (Severe, Verified 10/26/20 19:45)


   Difficulty breathing


Penicillins Allergy (Unknown, Verified 10/26/20 19:45)


   Unknown reaction


wheat Allergy (Verified 10/26/20 19:45)


   


Pickles Allergy (Uncoded 19 09:45)


   











Past Medical History





- Past Medical History


Cardiac Medical History: 


   Denies: Hx Coronary Artery Disease, Hx Heart Attack


   Comment Only: Hx Hypertension - ONLY TWO WEEKS after pregnancy


Pulmonary Medical History: Reports: Hx Bronchitis - TWICE A YEAR


   Denies: Hx Asthma, Hx COPD, Hx Pneumonia


Neurological Medical History: Denies: Hx Cerebrovascular Accident, Hx Seizures


Endocrine Medical History: Denies: Hx Diabetes Mellitus Type 1, Hx Diabetes 

Mellitus Type 2


Renal/ Medical History: Reports: Hx Ovarian Cysts.  Denies: Hx Peritoneal 

Dialysis


Musculoskeltal Medical History: Denies Hx Arthritis, Reports Hx Musculoskeletal 

Trauma - left foot fracture


Traumatic Medical History: Reports: Hx Fractures


Past Surgical History: Reports: Hx  Section - x3, Hx Kidney (Renal 

Surgery) - at age 14 months





- Immunizations


Hx Diphtheria, Pertussis, Tetanus Vaccination: Yes





Physical Exam





- Vital signs


Vitals: 





                                        











Temp Pulse Resp BP Pulse Ox


 


 98.2 F   103 H  18   143/97 H  94 


 


 11/15/20 20:55  11/15/20 20:55  11/15/20 20:55  11/15/20 20:55  11/15/20 20:55














Course





- Vital Signs


Vital signs: 





                                        











Temp Pulse Resp BP Pulse Ox


 


 98.2 F   103 H  18   143/97 H  94 


 


 11/15/20 20:55  11/15/20 20:55  11/15/20 20:55  11/15/20 20:55  11/15/20 20:55














Doctor's Discharge





- Discharge


Referrals: 


LETHA CALLAWAY MD [Primary Care Provider] - Follow up as needed

## 2020-11-16 NOTE — RADIOLOGY REPORT (SQ)
EXAM DESCRIPTION: X-ray single view chest.



CLINICAL HISTORY: 45 years Female, cough, shortness of breath



COMPARISON: 7/26/2020



TECHNIQUE: Single portable x-ray view of the chest performed on

11/16/2020 at 12:50 AM



FINDINGS: 

The lungs are well expanded and are clear. There is no evidence

of a pneumothorax.



The cardiac silhouette is normal in size and configuration.



The mediastinal contours are normal.



No acute osseous abnormality is identified.



No focal soft tissue abnormalities are seen.



Lines and tubes:  None.



IMPRESSION:

No evidence of acute intrathoracic disease.

## 2020-11-16 NOTE — ER DOCUMENT REPORT
ED General





- General


Chief Complaint: Abdominal Pain


Stated Complaint: ABDOMINAL PAIN


Time Seen by Provider: 11/15/20 21:06


Primary Care Provider: 


LETHA CALLAWAY MD [Primary Care Provider] - Follow up as needed


Mode of Arrival: Ambulatory


Notes: 


Patient is a 45-year-old female who comes emergency department for chief 

complaint of cough, wheezing, shortness of breath, pain in her upper abdomen 

mainly on the middle and right upper abdomen.  She states she has been sick for 

about 3 weeks, she was diagnosed with COVID-19 initially, told that she had 

bronchitis, took 3 days of steroids, a week later she took a course of 

azithromycin, she states she seemed better but then over the past couple weeks 

she has worsened again, she states over the past several days she has had pain 

going from her abdomen to her back and then up into her shoulder.  Cough is 

nonproductive.  She denies fever.  She reports intermittent nausea but she 

denies vomiting except one episode where she coughed until she vomited.  She 

states she has been taking a lot of Tums and ibuprofen to try to self treat.  

She has a past medical history of C-sections and a hysterectomy.











TRAVEL OUTSIDE OF THE [place] IN LAST 30 DAYS: No





- Related Data


Allergies/Adverse Reactions: 


                                        





doxycycline [Doxycycline] Allergy (Severe, Verified 10/26/20 19:45)


   Difficulty breathing


metronidazole [From Flagyl] Allergy (Severe, Verified 10/26/20 19:45)


   Difficulty breathing


Metronidazole HCl [From Flagyl] Allergy (Severe, Verified 10/26/20 19:45)


   Difficulty breathing


Penicillins Allergy (Unknown, Verified 10/26/20 19:45)


   Unknown reaction


wheat Allergy (Verified 10/26/20 19:45)


   


Pickles Allergy (Uncoded 19 09:45)


   











Past Medical History





- General


Information source: Patient





- Social History


Smoking Status: Never Smoker


Chew tobacco use (# tins/day): No


Frequency of alcohol use: None


Drug Abuse: None


Lives with: Family


Family History: Arthritis, CAD, CVA, DM, Hyperlipidemia, Hypertension, 

Malignancy, Thyroid Disfunction





- Past Medical History


Cardiac Medical History: 


   Denies: Hx Coronary Artery Disease, Hx Heart Attack


   Comment Only: Hx Hypertension - ONLY TWO WEEKS after pregnancy


Pulmonary Medical History: Reports: Hx Bronchitis - TWICE A YEAR


   Denies: Hx Asthma, Hx COPD, Hx Pneumonia


Neurological Medical History: Denies: Hx Cerebrovascular Accident, Hx Seizures


Endocrine Medical History: Denies: Hx Diabetes Mellitus Type 1, Hx Diabetes 

Mellitus Type 2


Renal/ Medical History: Reports: Hx Ovarian Cysts.  Denies: Hx Peritoneal 

Dialysis


Musculoskeletal Medical History: Denies Hx Arthritis, Reports Hx Musculoskeletal

Trauma - left foot fracture


Traumatic Medical History: Reports: Hx Fractures


Past Surgical History: Reports: Hx  Section - x3, Hx Kidney (Renal 

Surgery) - at age 14 months





- Immunizations


Hx Diphtheria, Pertussis, Tetanus Vaccination: Yes





Review of Systems





- Review of Systems


Constitutional: See HPI


EENT: No symptoms reported


Cardiovascular: No symptoms reported


Respiratory: See HPI


Gastrointestinal: See HPI


Genitourinary: No symptoms reported


Female Genitourinary: No symptoms reported


Musculoskeletal: No symptoms reported


Skin: No symptoms reported


Hematologic/Lymphatic: No symptoms reported


Neurological/Psychological: No symptoms reported





Physical Exam





- Vital signs


Vitals: 


                                        











Temp Pulse Resp BP Pulse Ox


 


 98.2 F   103 H  18   143/97 H  94 


 


 11/15/20 20:55  11/15/20 20:55  11/15/20 20:55  11/15/20 20:55  11/15/20 20:55














- Notes


Notes: 





GENERAL: Alert, interacts well. No acute distress.  Talkative and well-appearing


HEAD: Normocephalic, atraumatic.


EYES: Pupils equal, round, and reactive to light. Extraocular movements intact.


ENT: Oral mucosa moist, tongue midline. Oropharynx unremarkable. Airway patent. 

Nares patent, sinuses non-tender, ear canals unremarkable, TM's intact.


NECK: Full range of motion. Supple. Trachea midline. No lymphadenopathy.


LUNGS: Occasional congested coughing episodes during the interview.  Expiratory 

wheezes throughout.  Speaks in full sentences.  No tachypnea or respiratory 

distress.


HEART: Regular rate and rhythm. No murmur


ABDOMEN: Soft, non-tender. Non-distended.  No guarding or rigidity


EXTREMITIES: Moves all 4 extremities spontaneously. No edema, normal radial and 

dorsalis pedis pulses bilaterally. No cyanosis.


BACK: no cervical, thoracic, lumbar midline tenderness. No saddle anesthesia, 

normal distal neurovascular exam. Moves all extremities in full range of motion.


NEUROLOGICAL: Alert and oriented x3. Normal speech. Cranial nerves II through 

XII grossly intact. Strength 5/5 in all extremities. 


PSYCH: Normal affect, normal mood.


SKIN: Warm, dry, normal turgor. No rashes or lesions noted.





Course





- Re-evaluation


Re-evalutation: 


On my exam patient has a congested cough and expiratory wheezes throughout.  

Abdomen is actually completely unremarkable.  Ultrasound reviewed and 

unremarkable.  X-ray added but unremarkable.  Based on patient's recent frequent

ibuprofen ingestion and symptoms I suspect a component of gastritis along with 

bronchitis.  Patient just had COVID-19 and therefore after discussion was not 

retested.  No fever.  No hypoxia.  No distress.  After one DuoNeb wheezing 

completely resolved and symptoms significantly improved.  Patient tolerating 

p.o.  Patient asking for medications, she has a nebulizer at home to use but no 

medication for it, she is out of her inhaler.  Discussed all medications, 

expectations, follow-up, and return precautions.  Patient stable, well-

appearing, no complaints at time of discharge.





- Vital Signs


Vital signs: 


                                        











Temp Pulse Resp BP Pulse Ox


 


 98.2 F   103 H  18   143/97 H  94 


 


 11/15/20 21:49  11/15/20 20:55  11/15/20 20:55  11/15/20 20:55  11/15/20 20:55














- Laboratory


Result Diagrams: 


                                 11/15/20 21:55





                                 11/15/20 21:55


Laboratory results interpreted by me: 


                                        











  11/15/20 11/15/20





  21:20 21:55


 


WBC   12.3 H


 


MCH   26.7 L


 


RDW   15.2 H


 


Eos % (Auto)   6.2 H


 


Absolute Neuts (auto)   8.4 H


 


Absolute Eos (auto)   0.8 H


 


Urine Blood  SMALL H 














Discharge





- Discharge


Clinical Impression: 


 Wheezing, Cough, Nausea





Abdominal pain


Qualifiers:


 Abdominal location: generalized Qualified Code(s): R10.84 - Generalized 

abdominal pain





Condition: Stable


Disposition: HOME, SELF-CARE


Additional Instructions: 


Your ultrasound does not show any concerning findings.  Your chest x-ray and 

laboratory work-up are reassuring.


Your evaluation is still consistent with wheezing and bronchitis.  I also 

suspect inflammation of the upper gastrointestinal tract especially after the 

ibuprofen and steroids that you took.  Avoid NSAIDs, caffeine, alcohol, smoking,

spicy food, take the Carafate and Pepcid as prescribed.  Use albuterol inhaler 

and cough suppressant if needed.  Symptoms should gradually resolve with time.  

Follow-up with primary care.





Return if you worsen including spiking fever, difficulty breathing, severe 

abdominal pain, uncontrolled vomiting, or any other concerning or worsening 

symptoms.


Prescriptions: 


Albuterol Sulfate [Proventil 0.5% Neb 2.5 mg/0.5 ml Vial.neb] 2.5 mg NEB Q4HP 

PRN #30 vial.neb


 PRN Reason: 


Benzonatate [Tessalon Perles 100 mg Capsule] 100 mg PO Q8HP PRN #40 capsule


 PRN Reason: 


Sucralfate [Carafate 1 gm Tablet] 1 gm PO QID #20 tablet


Famotidine [Pepcid 20 mg Tablet] 20 mg PO BID #20 tablet


Albuterol Sulfate [Proair HFA Inhalation Aerosol 8.5 gm MDI] 2 puff IH Q4H PRN 

#1 mdi


 PRN Reason: 


Forms:  Treatment of Relative/Child


Referrals: 


LETHA CALLAWAY MD [Primary Care Provider] - Follow up as needed

## 2020-12-08 ENCOUNTER — HOSPITAL ENCOUNTER (OUTPATIENT)
Dept: HOSPITAL 62 - RAD | Age: 45
End: 2020-12-08
Attending: INTERNAL MEDICINE
Payer: MEDICAID

## 2020-12-08 DIAGNOSIS — R10.11: Primary | ICD-10-CM

## 2020-12-08 PROCEDURE — A9537 TC99M MEBROFENIN: HCPCS

## 2020-12-08 PROCEDURE — 78227 HEPATOBIL SYST IMAGE W/DRUG: CPT

## 2020-12-08 NOTE — RADIOLOGY REPORT (SQ)
EXAM DESCRIPTION:  NM HIDA SCAN WITH CCK



IMAGES COMPLETED DATE/TIME:  12/8/2020 10:00 am



REASON FOR STUDY:  R10.11 RIGHT UPPER QUADRANT PAIN R10.11  RIGHT UPPER QUADRANT PAIN



COMPARISON:  None.



RADIONUCLIDE AND DOSE:  DOSAGE RADIONUCLIDE: 5 millicuries Tc99m Mebrofenin.

DOSAGE CCK: 2.1 micrograms.

DOSAGE MORPHINE: Not required.

The route of agent administration: Intravenous



TECHNIQUE:  Serial imaging right upper quadrant up to 60 minutes following injection of radionuclide.
  CCK injected after gallbladder visualized.



LIMITATIONS:  None.



FINDINGS:  LIVER: Normal visualization without areas of photopenia.

INTRAHEPATIC BILE DUCTS: Normal size and no delay in visualization.

COMMON BILE DUCT: Normal without dilatation.

GALLBLADDER:   Normal visualization.  Calculated ejection fraction of 32%. Normal range is greater th
an 35%.

PHYSICAL RESPONSE:  Patients presenting complaint was reproduced.

OTHER: No other significant finding.



IMPRESSION:  Borderline gallbladder function with ejection fraction of 32% where 35% is considered no
rmal.  Patient's symptoms were reproduced with CCK administration.



TECHNICAL DOCUMENTATION:  JOB ID:  [id number]

 2011 [employer]- All Rights Reserved



Reading location - IP/workstation name: [email or web address]